# Patient Record
Sex: FEMALE | Race: BLACK OR AFRICAN AMERICAN | NOT HISPANIC OR LATINO | Employment: FULL TIME | URBAN - METROPOLITAN AREA
[De-identification: names, ages, dates, MRNs, and addresses within clinical notes are randomized per-mention and may not be internally consistent; named-entity substitution may affect disease eponyms.]

---

## 2017-02-13 ENCOUNTER — GENERIC CONVERSION - ENCOUNTER (OUTPATIENT)
Dept: OTHER | Facility: OTHER | Age: 52
End: 2017-02-13

## 2017-03-07 ENCOUNTER — ALLSCRIPTS OFFICE VISIT (OUTPATIENT)
Dept: OTHER | Facility: OTHER | Age: 52
End: 2017-03-07

## 2017-03-22 ENCOUNTER — ANESTHESIA EVENT (OUTPATIENT)
Dept: PERIOP | Facility: HOSPITAL | Age: 52
End: 2017-03-22

## 2017-03-23 ENCOUNTER — ANESTHESIA (OUTPATIENT)
Dept: PERIOP | Facility: HOSPITAL | Age: 52
End: 2017-03-23

## 2017-03-23 NOTE — ANESTHESIA PREPROCEDURE EVALUATION
Patient summary reviewed  Mallampati: III  TM distance: >3 FB  Neck ROM: full    PULMONARY - negative  Patient's breath sounds clear to auscultation. PULMONARY Positives: sleep apnea    NEURO/PSYCH - negative    ENDO/OTHER - negative    DENTAL     CARDIOVASCULAR - negative  CARDIOVASCULAR Positives: regular normal   Comments:  Additional Cardiovascular Comments: Systolic function was normal. Ejection fraction was estimated to be 60 % plus   or minus 5 %.   There were no regional wall motion abnormalities.   Left ventricular diastolic function parameters were normal.   RIGHT VENTRICLE:   The size was normal.   Systolic function was normal.   MITRAL VALVE:   There was mild annular calcification.   There was mild regurgitation.   TRICUSPID VALVE:   There was mild regurgitation.   Pulmonary artery systolic pressure was within the normal range.   Estimated peak PA pressure was 30 mmHg.   PULMONIC VALVE:   There was mild regurgitation.     GI/HEPATIC/RENAL - negative  GI/HEPATIC/RENAL Positives: GERD,     Anesthesia type: IV sedation with anesthesia  intravenous induction   Risks, benefits, and alternatives discussed with patient.  ASA 3

## 2017-04-06 ENCOUNTER — GENERIC CONVERSION - ENCOUNTER (OUTPATIENT)
Dept: OTHER | Facility: OTHER | Age: 52
End: 2017-04-06

## 2017-04-13 ENCOUNTER — HOSPITAL ENCOUNTER (OUTPATIENT)
Dept: MAMMOGRAPHY | Facility: CLINIC | Age: 52
Discharge: HOME | End: 2017-04-13
Payer: COMMERCIAL

## 2017-04-13 DIAGNOSIS — Z12.31 ENCOUNTER FOR SCREENING MAMMOGRAM FOR MALIGNANT NEOPLASM OF BREAST: ICD-10-CM

## 2017-04-13 PROCEDURE — G0202 SCR MAMMO BI INCL CAD: HCPCS

## 2017-04-24 ENCOUNTER — TRANSCRIBE ORDERS (OUTPATIENT)
Dept: LAB | Facility: CLINIC | Age: 52
End: 2017-04-24

## 2017-04-24 ENCOUNTER — APPOINTMENT (OUTPATIENT)
Dept: LAB | Facility: CLINIC | Age: 52
End: 2017-04-24
Payer: COMMERCIAL

## 2017-04-24 DIAGNOSIS — Z98.84 BARIATRIC SURGERY STATUS: ICD-10-CM

## 2017-04-24 DIAGNOSIS — M79.10 MYALGIA: ICD-10-CM

## 2017-04-24 DIAGNOSIS — I10 ESSENTIAL (PRIMARY) HYPERTENSION: ICD-10-CM

## 2017-04-24 DIAGNOSIS — K90.2 POSTOPERATIVE BLIND LOOP SYNDROME: Primary | ICD-10-CM

## 2017-04-24 LAB
25(OH)D3 SERPL-MCNC: 24.8 NG/ML (ref 30–100)
ALBUMIN SERPL BCP-MCNC: 3.8 G/DL (ref 3.5–5)
ALP SERPL-CCNC: 72 U/L (ref 46–116)
ALT SERPL W P-5'-P-CCNC: 17 U/L (ref 12–78)
ANION GAP SERPL CALCULATED.3IONS-SCNC: 5 MMOL/L (ref 4–13)
AST SERPL W P-5'-P-CCNC: 8 U/L (ref 5–45)
BILIRUB SERPL-MCNC: 0.75 MG/DL (ref 0.2–1)
BUN SERPL-MCNC: 18 MG/DL (ref 5–25)
CALCIUM SERPL-MCNC: 9 MG/DL (ref 8.3–10.1)
CHLORIDE SERPL-SCNC: 106 MMOL/L (ref 100–108)
CHOLEST SERPL-MCNC: 175 MG/DL (ref 50–200)
CO2 SERPL-SCNC: 28 MMOL/L (ref 21–32)
CREAT SERPL-MCNC: 0.61 MG/DL (ref 0.6–1.3)
ERYTHROCYTE [DISTWIDTH] IN BLOOD BY AUTOMATED COUNT: 12.9 % (ref 11.6–15.1)
FERRITIN SERPL-MCNC: 32 NG/ML (ref 8–388)
FOLATE SERPL-MCNC: 6.4 NG/ML (ref 3.1–17.5)
GFR SERPL CREATININE-BSD FRML MDRD: >60 ML/MIN/1.73SQ M
GLUCOSE P FAST SERPL-MCNC: 84 MG/DL (ref 65–99)
HCT VFR BLD AUTO: 39.6 % (ref 34.8–46.1)
HDLC SERPL-MCNC: 79 MG/DL (ref 40–60)
HGB BLD-MCNC: 12.9 G/DL (ref 11.5–15.4)
LDLC SERPL CALC-MCNC: 85 MG/DL (ref 0–100)
MCH RBC QN AUTO: 30 PG (ref 26.8–34.3)
MCHC RBC AUTO-ENTMCNC: 32.6 G/DL (ref 31.4–37.4)
MCV RBC AUTO: 92 FL (ref 82–98)
PLATELET # BLD AUTO: 242 THOUSANDS/UL (ref 149–390)
PMV BLD AUTO: 10.7 FL (ref 8.9–12.7)
POTASSIUM SERPL-SCNC: 3.7 MMOL/L (ref 3.5–5.3)
PROT SERPL-MCNC: 7.2 G/DL (ref 6.4–8.2)
PTH-INTACT SERPL-MCNC: 25.4 PG/ML (ref 14–72)
RBC # BLD AUTO: 4.3 MILLION/UL (ref 3.81–5.12)
SODIUM SERPL-SCNC: 139 MMOL/L (ref 136–145)
TRIGL SERPL-MCNC: 54 MG/DL
TSH SERPL DL<=0.05 MIU/L-ACNC: 1.73 UIU/ML (ref 0.36–3.74)
VIT B12 SERPL-MCNC: 648 PG/ML (ref 100–900)
WBC # BLD AUTO: 3.15 THOUSAND/UL (ref 4.31–10.16)

## 2017-04-24 PROCEDURE — 84590 ASSAY OF VITAMIN A: CPT

## 2017-04-24 PROCEDURE — 36415 COLL VENOUS BLD VENIPUNCTURE: CPT

## 2017-04-24 PROCEDURE — 80061 LIPID PANEL: CPT

## 2017-04-24 PROCEDURE — 82607 VITAMIN B-12: CPT

## 2017-04-24 PROCEDURE — 84425 ASSAY OF VITAMIN B-1: CPT

## 2017-04-24 PROCEDURE — 82728 ASSAY OF FERRITIN: CPT

## 2017-04-24 PROCEDURE — 85027 COMPLETE CBC AUTOMATED: CPT

## 2017-04-24 PROCEDURE — 83970 ASSAY OF PARATHORMONE: CPT

## 2017-04-24 PROCEDURE — 82746 ASSAY OF FOLIC ACID SERUM: CPT

## 2017-04-24 PROCEDURE — 84443 ASSAY THYROID STIM HORMONE: CPT

## 2017-04-24 PROCEDURE — 82306 VITAMIN D 25 HYDROXY: CPT

## 2017-04-24 PROCEDURE — 80053 COMPREHEN METABOLIC PANEL: CPT

## 2017-04-25 ENCOUNTER — GENERIC CONVERSION - ENCOUNTER (OUTPATIENT)
Dept: OTHER | Facility: OTHER | Age: 52
End: 2017-04-25

## 2017-04-27 LAB
VIT A SERPL-MCNC: 48 UG/DL (ref 20–65)
VIT B1 BLD-SCNC: 128.2 NMOL/L (ref 66.5–200)

## 2017-04-28 ENCOUNTER — ALLSCRIPTS OFFICE VISIT (OUTPATIENT)
Dept: OTHER | Facility: OTHER | Age: 52
End: 2017-04-28

## 2017-05-09 ENCOUNTER — GENERIC CONVERSION - ENCOUNTER (OUTPATIENT)
Dept: OTHER | Facility: OTHER | Age: 52
End: 2017-05-09

## 2017-05-18 ENCOUNTER — GENERIC CONVERSION - ENCOUNTER (OUTPATIENT)
Dept: OTHER | Facility: OTHER | Age: 52
End: 2017-05-18

## 2017-07-06 ENCOUNTER — ALLSCRIPTS OFFICE VISIT (OUTPATIENT)
Dept: OTHER | Facility: OTHER | Age: 52
End: 2017-07-06

## 2017-08-02 ENCOUNTER — TRANSCRIBE ORDERS (OUTPATIENT)
Dept: LAB | Facility: CLINIC | Age: 52
End: 2017-08-02

## 2017-08-02 ENCOUNTER — APPOINTMENT (OUTPATIENT)
Dept: LAB | Facility: CLINIC | Age: 52
End: 2017-08-02
Payer: COMMERCIAL

## 2017-08-02 DIAGNOSIS — Z00.8 HEALTH EXAMINATION IN POPULATION SURVEY: Primary | ICD-10-CM

## 2017-08-02 DIAGNOSIS — Z00.8 HEALTH EXAMINATION IN POPULATION SURVEY: ICD-10-CM

## 2017-08-02 LAB
CHOLEST SERPL-MCNC: 152 MG/DL (ref 50–200)
EST. AVERAGE GLUCOSE BLD GHB EST-MCNC: 108 MG/DL
HBA1C MFR BLD: 5.4 % (ref 4.2–6.3)
HDLC SERPL-MCNC: 75 MG/DL (ref 40–60)
LDLC SERPL CALC-MCNC: 70 MG/DL (ref 0–100)
TRIGL SERPL-MCNC: 33 MG/DL

## 2017-08-02 PROCEDURE — 80061 LIPID PANEL: CPT

## 2017-08-02 PROCEDURE — 36415 COLL VENOUS BLD VENIPUNCTURE: CPT

## 2017-08-02 PROCEDURE — 83036 HEMOGLOBIN GLYCOSYLATED A1C: CPT

## 2017-10-28 DIAGNOSIS — M25.571 PAIN IN RIGHT ANKLE: ICD-10-CM

## 2017-10-28 DIAGNOSIS — I10 ESSENTIAL (PRIMARY) HYPERTENSION: ICD-10-CM

## 2017-11-17 ENCOUNTER — ALLSCRIPTS OFFICE VISIT (OUTPATIENT)
Dept: OTHER | Facility: OTHER | Age: 52
End: 2017-11-17

## 2017-11-17 ENCOUNTER — APPOINTMENT (OUTPATIENT)
Dept: RADIOLOGY | Facility: CLINIC | Age: 52
End: 2017-11-17
Payer: COMMERCIAL

## 2017-11-17 DIAGNOSIS — M25.571 PAIN IN RIGHT ANKLE: ICD-10-CM

## 2017-11-17 PROCEDURE — 73610 X-RAY EXAM OF ANKLE: CPT

## 2017-11-20 NOTE — PROGRESS NOTES
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 37) (M19 101)    Plan  Health Maintenance, Osteoarthritis of right subtalar joint, Primary osteoarthritis of rightankle or foot    · Follow-up PRN Evaluation and Treatment  Follow-up  Status: Complete  Done:19Nov2017  Right ankle pain    · * XR ANKLE 3+ VIEW RIGHT; Status:Resulted - Requires Verification,Retrospective ByProtocol Authorization;   Done: [id number] 08:57AM    Discussion/Summary    We are referring the patient to a foot and ankle specialist for consideration of fusion  Chief Complaint    1  Ankle Pain    History of Present Illness  HPI: Patient is a 52-year-old Female who presents with chief complaint of swelling and pain in the right hindfoot and ankle  She has been worked up to this extensively in the past and had extensive treatment in the past including injections and orthotics and physical therapy and braces  Pain is worse with weight-bearing  She denies any warmth or erythema  Review of Systems   Constitutional: No fever, no chills, feels well, no tiredness, no recent weight gain or loss  Eyes: No complaints of eyesight problems, no red eyes  ENT: no loss of hearing, no nosebleeds, no sore throat  Cardiovascular: No complaints of chest pain, no palpitations, no leg claudication or lower extremity edema  Respiratory: no compliants of shortness of breath, no wheezing, no cough  Gastrointestinal: no complaints of abdominal pain, no constipation, no nausea or diarrhea, no vomiting, no bloody stools  Genitourinary: no complaints of dysuria, no incontinence  Musculoskeletal: as noted in HPI  Integumentary: no complaints of skin rash or lesion, no itching or dry skin, no skin wounds  Neurological: no complaints of headache, no confusion, no numbness or tingling, no dizziness    Endocrine: No complaints of muscle weakness, no feelings of weakness, no frequent urination, no excessive thirst   Psychiatric: No suicidal thoughts, no anxiety, no feelings of depression  ROS reviewed  Active Problems    1  Arthralgia of both knees (719 46) (M25 561,M25 562)   2  Benign essential hypertension (401 1) (I10)   3  Bilateral ankle pain (719 47) (M25 571,M25 572)   4  Depression (311) (F32 9)   5  Edema (782 3) (R60 9)   6  Encounter for screening mammogram for malignant neoplasm of breast (V76 12) (Z12 31)   7  Heartburn (787 1) (R12)   8  History of hypertension (V12 59) (Z86 79)   9  Leukopenia, unspecified decreased WBC count   10  Myalgia (729 1) (M79 1)   11  Obesity (278 00) (E66 9)   12  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 3) (K91 2,Z90 3)   13  Primary osteoarthritis of right ankle or foot (715 17) (M19 071)   14  Right ankle pain (719 47) (M25 571)   15  S/P laparoscopic sleeve gastrectomy (V45 86) (Z98 84)   16  Screening for colon cancer (V76 51) (Z12 11)   17  Screening for HPV (human papillomavirus) (V73 81) (Z11 51)   18  Vitamin D deficiency (268 9) (E55 9)    Past Medical History     · History of Acute pharyngitis (462) (J02 9)   · History of Denial Of Any Significant Medical History   · History of Encounter for routine gynecological examination with Papanicolaou smear ofcervix (V72 31,V76 2) (Z01 419)   · History of acute bronchitis (V12 69) (Z87 09)   · History of normal mammogram (V49 89)   · History of shortness of breath (V13 89) (T56 694)   · History of TORY (obstructive sleep apnea) (327 23) (G47 33)   · History of Preop cardiovascular exam (V72 81) (Z01 810)   · History of Vaginal Pap smear (V76 47) (Z12 72)   · History of Vitamin D deficiency (268 9) (E55 9)    The active problems and past medical history were reviewed and updated today        Surgical History     · History of Gastric Surgery For Morbid Obesity   · History of Gastric Surgery For Morbid Obesity Laparoscopic Longitudinal Gastrectomy   · History of Repair Of Paraesophageal Hiatus Hernia   · History of Tubal Ligation    The surgical history was reviewed and updated today  Family History  Mother    · Family history of    · Family history of diabetes mellitus (V18 0) (Z83 3)   · Family history of hypertension (V17 49) (Z82 49)  Father    · Family history of   Family History    · Family history of Denial Of Any Significant Medical History    The family history was reviewed and updated today  Social History     · Caffeine use (V49 89) (F15 90)   · Denied: History of Currently sexually active   · Denied: History of Drug use   · Exercise habits   · Never A Smoker   · No alcohol use   · Tubal ligation (V26 51) (Z98 51)  The social history was reviewed and updated today  Current Meds   1  Biotin 5000 MCG Oral Capsule; TAKE 1 CAPSULE DAILY Recorded   2  Fusion Plus Oral Capsule; ii po bid; Therapy: (Recorded:2015) to Recorded   3  Losartan Potassium-HCTZ 50-12 5 MG Oral Tablet; Take 1 tablet daily; Therapy: [id number] to (Last Rx:2017)  Requested for: 2017 Ordered   4  Omeprazole 20 MG Oral Capsule Delayed Release; Therapy: (Recorded:43Ecm1961) to Recorded   5  Vitamin D 2000 UNIT Oral Capsule; 1 CAP DAILY; Therapy: (Recorded:2015) to Recorded    The medication list was reviewed and updated today  Allergies  1  No Known Drug Allergies    Vitals  Signs     Heart Rate: 65  Systolic: 536  Diastolic: 94  Height: 5 ft 2 in  Weight: 185 lb   BMI Calculated: 33 84  BSA Calculated: 1 85    Physical Exam    Right Ankle: Appearance: a deformity (Calcaneovalgus and pes planus ), but-- no Achilles defect,-- no ecchymosis-- and-- no erythema  Tenderness: anterior lateral gutter-- and-- talar head  ROM: painful limited ROM in all planes Motor: Normal    Right Foot: Appearance: pes planus-- and-- swelling  Tenderness: medial calcaneous,-- lateral calcaneous-- and-- lateral hindfoot    Constitutional - General appearance: Normal   Musculoskeletal - Gait and station: Abnormal  Gait evaluation demonstrated antalgia on the right  -- Digits and nails: Normal -- Muscle strength/tone: Normal   Cardiovascular - Pulses: Normal -- Examination of extremities for edema and/or varicosities: Normal   Skin - Skin and subcutaneous tissue: Normal   Neurologic - Sensation: Normal   Psychiatric - Orientation to person, place, and time: Normal -- Mood and affect: Normal   Eyes  Conjunctiva and lids: Normal    Pupils and irises: Normal        Results/Data  * XR ANKLE 3+ VIEW RIGHT [id number] 08:57AM Erica Benson Order Number: [id number]  Performing Comments: room 1     Test Name Result Flag Reference   XR ANKLE 3+ VW RIGHT (Report)       RIGHT ANKLE   INDICATION: Pain in the right ankle and foot   COMPARISON: July 15, 2015   VIEWS: 3   IMAGES: 3   FINDINGS:   There is no acute fracture or dislocation  There is tibiotalar arthritis  Moderate-sized plantar calcaneal spur noted there is marked arthritic changes noted within the posterior subtalar joint  There is talonavicular arthritis  Ridging from the dorsal aspect of the talus  No lytic or blastic lesions are seen  Soft tissues are unremarkable  IMPRESSION:   Moderate degenerative changes seen within the posterior subtalar joint  Arthritic changes in the talonavicular joint  Tibiotalar arthritis with prominent anterior spurring from the distal tibia  This may represent an anterior impingement   Chronic deformity of the medial aspect of the distal fibula  Overall appearance is stable since previous study of foot July 15, 2015  A CT can be helpful to evaluate the the arthritic changes if needed    Workstation performed: [id number]   Signed by:  Johanny Gonzalez MD  11/19/17                                Views: AP, Lateral and oblique views of the right ankle  Findings: no fracture--   Calcaneovalgus  Arthritis of the subtalar joint and talonavicular joint  Calcifications soft tissue around ankle joint  There is mild soft tissue swelling  I personally reviewed the films/images/results in the office today  My interpretation follows  CT Scan Review CT right foot 06/01/2016is a nonosseous calcaneonavicular joint tarsal coalition and a nonosseous middle subtalar joint tarsal coalitionThere is severe osteoarthritis of both the posterior subtalar joint and middle subtalar joint  there is also arthritis of the talonavicular joint and to a lesser extent the ankle  Well corticated ossific fragment adjacent to the anterior aspect of the lateral malleolus        Future Appointments    Date/Time Provider Specialty Site   07/13/2018 10:00 AM Diana Newton, [facility]       Signatures   Electronically signed by : AGUSTIN Cano ; Nov 19 2017  9:20PM EST                       (Author)

## 2018-01-10 NOTE — MISCELLANEOUS
Message   Recorded as Task   Date: 05/09/2017 09:00 AM, Created By: Mauri Gerber   Task Name: Follow Up   Assigned To: Lindy Maldonado   Regarding Patient: Chris Rivera, Status: Active   Comment:    Viry Orr - 09 May 2017 9:00 AM     TASK CREATED  Please call patient to schedule f/u appt  with our office  Thank you  Per assigned task, I left message for Diallo Brown patient about scheduling an appointment at our office since patient is overdue for a follow up  Active Problems    1  Arthralgia of both knees (719 46) (M25 561,M25 562)   2  Benign essential hypertension (401 1) (I10)   3  Bilateral ankle pain (719 47) (M25 571,M25 572)   4  Depression (311) (F32 9)   5  Edema (782 3) (R60 9)   6  Encounter for screening mammogram for malignant neoplasm of breast (V76 12)   (Z12 31)   7  History of hypertension (V12 59) (Z86 79)   8  Leukopenia, unspecified decreased WBC count   9  Myalgia (729 1) (M79 1)   10  Obesity (278 00) (E66 9)   11  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 3) (K91 2,Z90 3)   12  Primary osteoarthritis of right ankle or foot (715 17) (M19 071)   13  S/P laparoscopic sleeve gastrectomy (V45 86) (Z98 84)   14  Screening for colon cancer (V76 51) (Z12 11)   15  Screening for HPV (human papillomavirus) (V73 81) (Z11 51)   16  Vitamin D deficiency (268 9) (E55 9)    Current Meds   1  Biotin 5000 MCG Oral Capsule; TAKE 1 CAPSULE DAILY Recorded   2  Fusion Plus Oral Capsule; ii po bid; Therapy: (Recorded:30Jan2015) to Recorded   3  Losartan Potassium-HCTZ 50-12 5 MG Oral Tablet; Take 1 tablet daily; Therapy: [id number] to (Last Rx:28Apr2017)  Requested for: 28Apr2017 Ordered   4  Vitamin D 2000 UNIT Oral Capsule; 1 CAP DAILY; Therapy: (Recorded:30Jan2015) to Recorded    Allergies    1   No Known Drug Allergies    Signatures   Electronically signed by : Hector Colindres, ; May  9 2017 10:38AM EST                       (Author)

## 2018-01-12 VITALS
WEIGHT: 185 LBS | DIASTOLIC BLOOD PRESSURE: 94 MMHG | HEART RATE: 65 BPM | HEIGHT: 62 IN | SYSTOLIC BLOOD PRESSURE: 163 MMHG | BODY MASS INDEX: 34.04 KG/M2

## 2018-01-14 VITALS
HEIGHT: 62 IN | RESPIRATION RATE: 18 BRPM | DIASTOLIC BLOOD PRESSURE: 82 MMHG | HEART RATE: 80 BPM | WEIGHT: 179 LBS | TEMPERATURE: 98 F | BODY MASS INDEX: 32.94 KG/M2 | SYSTOLIC BLOOD PRESSURE: 134 MMHG

## 2018-01-14 VITALS
OXYGEN SATURATION: 98 % | DIASTOLIC BLOOD PRESSURE: 94 MMHG | BODY MASS INDEX: 32.41 KG/M2 | SYSTOLIC BLOOD PRESSURE: 144 MMHG | HEART RATE: 78 BPM | WEIGHT: 176.13 LBS | HEIGHT: 62 IN

## 2018-01-15 VITALS
DIASTOLIC BLOOD PRESSURE: 78 MMHG | HEIGHT: 62 IN | BODY MASS INDEX: 32.39 KG/M2 | SYSTOLIC BLOOD PRESSURE: 122 MMHG | WEIGHT: 176 LBS

## 2018-01-16 NOTE — MISCELLANEOUS
Provider Comments  Provider Comments:   Dear Awilda Mallory had a scheduled appointment at our office for 02/13/2017 but were unable to keep  We attempted to call you back but were unable to reach you  It is very important that you follow up with us so that we can assess your physical and nutritional safety after your surgery  Please call our office at [phone] to reschedule your appointment         Sincerely,     [facility]          Signatures   Electronically signed by : Yannick William, ; Feb 13 2017 11:56AM EST                       (Author)

## 2018-01-17 NOTE — RESULT NOTES
Discussion/Summary  Your results show some abnormalities  2/8/16 labs reviewed  Your white blood cell count is low at 3 03  This can sometimes occur with rapid weight loss  White blood cells help the body fight off infection  If you having frequent illness or infection you should follow-up with her family doctor and have this further evaluated      Your vitamin B 12 is elevated at 1351  This is safe and okay  Your folate level is also elevated at 18 9 this is also safe and okay    Your HDL or "good cholesterol" is high at 77  This is good  Your other vitamin and mineral levels are within acceptable range  KEEP UP THE GOOD WORK taking all your supplements  Please keep your regularly scheduled follow-up appointment  If you do not have a follow-up scheduled, please call the office to schedule a follow-up visit  If you have any questions, please don't hesitate to call the office     Sincerely,      Signatures   Electronically signed by : HAKEEM Garcia; Mar  1 2016  2:07PM EST                       (Author)    Electronically signed by : Cala Rubinstein, M D ; Mar  2 2016  7:50AM EST                       (Co-author)

## 2018-01-18 NOTE — RESULT NOTES
Discussion/Summary   Your vitamin d is low at 24 8-   Both vitamin D and calcium are important for bone health  You need to add up the TOTAL  amount of vitamin D that is in your multivitamin/mineral (s) and what is in your calcium citrate with D together  The TOTAL  should add up to between 4766-2573 IU of vitamin D per day  You may need to add additional vitamin D3  to get to this level  Vitamin D 3  can be found over -the-counter  Your TOTAL  calcium amount for the day -which includes the calcium in  all your supplements ADDED TOGETHER should equal  1500 mg per day (up  to 1900 mg may be ok  )This includes the calcium that is in your Multivitamin/mineral(s) and what is in your calcium citrate with D   Calcium is best absorbed in divided doses of 500 mg each  Also it is best spaced 2 hours apart from any iron  Iron and calcium taken at the same time âfightâ to be absorbed, so do not take them at the same time  Your white blood cell count is low at 3 4  This can sometimes be seen during times of active rapid weight loss but should correct after that  White blood cells help the body fight off infection  If you are having frequent illness or infection, please review with PCP now otherwise review with PCP at a routine follow-up as this may need further evaluation             Verified Results  (1) CBC/ PLT (NO DIFF) 24Apr2017 11:43AM Gale Squires     Test Name Result Flag Reference   HEMATOCRIT 39 6 %  34 8-46 1   HEMOGLOBIN 12 9 g/dL  11 5-15 4   MCHC 32 6 g/dL  31 4-37 4   MCH 30 0 pg  26 8-34 3   MCV 92 fL  82-98   PLATELET COUNT 980 Thousands/uL  149-390   RBC COUNT 4 30 Million/uL  3 81-5 12   RDW 12 9 %  11 6-15 1   WBC COUNT 3 15 Thousand/uL L 4 31-10 16   MPV 10 7 fL  8 9-12 7     (1) PTH N-TERMINAL (INTACT) [id number] 11:43AM Ismael Newton     Test Name Result Flag Reference   PARATHYROID HORMONE INTACT 25 4 pg/mL  14 0-72 0     (1) VITAMIN D 25-HYDROXY [id number] 11:43AM Gil Diane     Test Name Result Flag Reference   VIT D 25-HYDROX 24 8 ng/mL L 30 0-100 0   This assay is a certified procedure of the CDC Vitamin D Standardization Certification Program (VDSCP)     Deficiency <20ng/ml   Insufficiency 20-30ng/ml   Sufficient  ng/ml     *Patients undergoing fluorescein dye angiography may retain small amounts of fluorescein in the body for 48-72 hours post procedure  Samples containing fluorescein can produce falsely elevated Vitamin D values  If the patient had this procedure, a specimen should be resubmitted post fluorescein clearance  (1) LIPID PANEL, FASTING 24Apr2017 11:43AM Juan Newton     Test Name Result Flag Reference   CHOLESTEROL 175 mg/dL     HDL,DIRECT 79 mg/dL H 40-60   Specimen collection should occur prior to Metamizole administration due to the potential for falsely depressed results  LDL CHOLESTEROL CALCULATED 85 mg/dL  0-100   This is a fasting blood test  Water,black tea or black  coffee only after 9:00pm the night before test  Drink 2 glasses of water the morning of test         Triglyceride:         Normal              <150 mg/dl       Borderline High    150-199 mg/dl       High               200-499 mg/dl       Very High          >499 mg/dl  Cholesterol:         Desirable        <200 mg/dl      Borderline High  200-239 mg/dl      High             >239 mg/dl  HDL Cholesterol:        High    >59 mg/dL      Low     <41 mg/dL  LDL CALCULATED:    This screening LDL is a calculated result  It does not have the accuracy of the Direct Measured LDL in the monitoring of patients with hyperlipidemia and/or statin therapy  Direct Measure LDL (GVM050) must be ordered separately in these patients  TRIGLYCERIDES 54 mg/dL  <=150   Specimen collection should occur prior to N-Acetylcysteine or Metamizole administration due to the potential for falsely depressed results       (1) VITAMIN B12 24Apr2017 11:43AM Gil Diane     Test Name Result Flag Reference   VITAMIN B12 648 pg/mL  100-900     (1) COMPREHENSIVE METABOLIC PANEL [id number] 81:42CT Johan Barksdale     Test Name Result Flag Reference   SODIUM 139 mmol/L  136-145   POTASSIUM 3 7 mmol/L  3 5-5 3   CHLORIDE 106 mmol/L  100-108   CARBON DIOXIDE 28 mmol/L  21-32   ANION GAP (CALC) 5 mmol/L  4-13   BLOOD UREA NITROGEN 18 mg/dL  5-25   CREATININE 0 61 mg/dL  0 60-1 30   Standardized to IDMS reference method   CALCIUM 9 0 mg/dL  8 3-10 1   BILI, TOTAL 0 75 mg/dL  0 20-1 00   ALK PHOSPHATAS 72 U/L     ALT (SGPT) 17 U/L  12-78   AST(SGOT) 8 U/L  5-45   ALBUMIN 3 8 g/dL  3 5-5 0   TOTAL PROTEIN 7 2 g/dL  6 4-8 2   eGFR Non-African American      >60 0 ml/min/1 73[facility] Disease Education Program recommendations are as follows:  GFR calculation is accurate only with a steady state creatinine  Chronic Kidney disease less than 60 ml/min/1 73 sq  meters  Kidney failure less than 15 ml/min/1 73 sq  meters     GLUCOSE FASTING 84 mg/dL  65-99     (1) FERRITIN [id number] 11:43AM Johan Barksdale     Test Name Result Flag Reference   FERRITIN 32 ng/mL  8-388     (1) FOLATE [id number] 11:43AM Johan Barksdale     Test Name Result Flag Reference   FOLATE 6 4 ng/mL  3 1-17 5     (1) VITAMIN A [id number] 11:43AM Johan Barksdale     Test Name Result Flag Reference   VITAMIN A 48 ug/dL  20 - 65   Performed at:  [address]  [id number]  : Baldemar Amin MD, Phone:  [phone]     (1) VITAMIN B1, WHOLE BLOOD [id number] 11:43AM Johan Barksdale     Test Name Result Flag Reference   VITAMIN B1, WHOLE BLOOD 128 2 nmol/L  66 5 - 200 0   Performed at:  [address]  [id number]  : Baldemar Amin MD, Phone:  [phone]

## 2018-01-31 ENCOUNTER — OFFICE VISIT (OUTPATIENT)
Dept: INTERNAL MEDICINE CLINIC | Facility: CLINIC | Age: 53
End: 2018-01-31
Payer: COMMERCIAL

## 2018-01-31 VITALS
HEIGHT: 64 IN | DIASTOLIC BLOOD PRESSURE: 80 MMHG | SYSTOLIC BLOOD PRESSURE: 132 MMHG | BODY MASS INDEX: 31.82 KG/M2 | HEART RATE: 68 BPM | WEIGHT: 186.4 LBS | RESPIRATION RATE: 14 BRPM

## 2018-01-31 DIAGNOSIS — M25.561 ARTHRALGIA OF BOTH KNEES: ICD-10-CM

## 2018-01-31 DIAGNOSIS — R12 HEARTBURN: ICD-10-CM

## 2018-01-31 DIAGNOSIS — M19.071 OSTEOARTHRITIS OF RIGHT ANKLE OR FOOT: ICD-10-CM

## 2018-01-31 DIAGNOSIS — Z00.00 HEALTH CARE MAINTENANCE: ICD-10-CM

## 2018-01-31 DIAGNOSIS — M25.562 ARTHRALGIA OF BOTH KNEES: ICD-10-CM

## 2018-01-31 DIAGNOSIS — I10 BENIGN ESSENTIAL HYPERTENSION: Primary | ICD-10-CM

## 2018-01-31 DIAGNOSIS — E66.09 CLASS 1 OBESITY DUE TO EXCESS CALORIES WITHOUT SERIOUS COMORBIDITY IN ADULT, UNSPECIFIED BMI: ICD-10-CM

## 2018-01-31 DIAGNOSIS — Z12.39 SCREENING FOR BREAST CANCER: ICD-10-CM

## 2018-01-31 DIAGNOSIS — E55.9 VITAMIN D DEFICIENCY: ICD-10-CM

## 2018-01-31 PROCEDURE — 99214 OFFICE O/P EST MOD 30 MIN: CPT | Performed by: INTERNAL MEDICINE

## 2018-01-31 RX ORDER — MULTIVIT-MIN/IRON/FOLIC ACID/K 18-600-40
CAPSULE ORAL DAILY
COMMUNITY

## 2018-01-31 RX ORDER — LOSARTAN POTASSIUM AND HYDROCHLOROTHIAZIDE 12.5; 5 MG/1; MG/1
1 TABLET ORAL DAILY
Qty: 90 TABLET | Refills: 3 | Status: SHIPPED | OUTPATIENT
Start: 2018-01-31 | End: 2018-08-07 | Stop reason: SDUPTHER

## 2018-01-31 RX ORDER — LOSARTAN POTASSIUM AND HYDROCHLOROTHIAZIDE 12.5; 5 MG/1; MG/1
1 TABLET ORAL DAILY
COMMUNITY
Start: 2016-12-19 | End: 2018-01-31 | Stop reason: SDUPTHER

## 2018-01-31 RX ORDER — NICOTINE POLACRILEX 4 MG/1
GUM, CHEWING ORAL
COMMUNITY
End: 2018-06-29 | Stop reason: ALTCHOICE

## 2018-01-31 NOTE — PROGRESS NOTES
INTERNAL MEDICINE OFFICE VISIT  [facility] AT [address], [address] [place] [address]  Tel: [phone]      NAME: Dallas Darby  AGE: 47 y o  SEX: female  : 1965   MRN: [id number]    DATE: 2018  TIME: 12:43 PM      Assessment and Plan:  1  Benign essential hypertension  Blood pressure is well controlled, continue same medication   - losartan-hydrochlorothiazide (HYZAAR) 50-12 5 mg per tablet; Take 1 tablet by mouth daily  Dispense: 90 tablet; Refill: 3  - CBC and differential; Future  - Comprehensive metabolic panel; Future  - Lipid panel; Future  - TSH, 3rd generation; Future  - Urinalysis with microscopic; Future    2  Heartburn  Takes omeprazole only as needed    3  Arthralgia of both knees  She was advised to follow up with Orthopedics  4  Osteoarthritis of right ankle or foot  She has been following up with Podiatry and says that she may need surgery in the future  5  Class 1 obesity due to excess calories without serious comorbidity in adult, unspecified BMI  Recently she has been getting weight and has 14 lb weight gain in the last 1 year  She says she can not exercise secondary to pain in her joints  I encouraged her to walk as much as possible and watch her diet  6  Vitamin D deficiency  Continue vitamin-D supplement    7  Screening for breast cancer    - Mammo screening bilateral w cad; Future    8  Health care maintenance    - Ambulatory referral to Gynecology; Future      - Counseling Documentation: patient was counseled regarding: diagnostic results, instructions for management, risk factor reductions, impressions, risks and benefits of treatment options and importance of compliance with treatment  - Medication Side Effects: Adverse side effects of medications were reviewed with the patient/guardian today  Return for follow up visit in 6 months or earlier, if needed        Chief Complaint:  Chief Complaint   Patient presents with   [facility] Follow-up    GERD         History of Present Illness:   Hypertension-she has been taking losartan and hydrochlorothiazide but feels that it can cause weight gain  I assured her that these medications will not cause weight gain and she needs to take them regularly  GERD-she has symptoms of heartburn off and on  Ankle pain-she follows up with Podiatry and had a shot of steroids in her right ankle today  Knee pain-she has pain in both her knees most likely due to osteoarthritis  Obesity-she had a gastric bypass surgery in the past and was doing well but because she stopped exercising she has been gaining weight for the last 1 year  Active Problem List:  Patient Active Problem List   Diagnosis    Arthralgia of both knees    Benign essential hypertension    Depression    Edema    Heartburn    Obesity    Postgastrectomy malabsorption    Osteoarthritis of ankle or foot    Vitamin D deficiency         Past Medical History:  Past Medical History:   Diagnosis Date    GERD (gastroesophageal reflux disease)     Obstructive sleep apnea     Last Assessed: 11Aug2015    Vitamin D deficiency     Last Assessed: 11Aug2015         Past Surgical History:  Past Surgical History:   Procedure Laterality Date    GASTRECTOMY      Gastric Surgery for Morbid Obesity Laparoscopic Longitudinal Gastrectomy: Onset: [id number]    GASTRIC RESTRICTION SURGERY      for Morbid Obesity; Last Assessed: [id number]    HIATAL HERNIA REPAIR      PARAESOPHAGEAL HERNIA REPAIR      Hiatus Hernia;  Onset: [id number]    STOMACH SURGERY      TUBAL LIGATION      TUBAL LIGATION           Family History:  Family History   Problem Relation Age of Onset    Diabetes Mother     Hypertension Mother     No Known Problems Family          Social History:  Social History     Social History    Marital status: /Civil Union     Spouse name: N/A    Number of children: N/A    Years of education: N/A     Social History Main Topics    Smoking status: Never Smoker    Smokeless tobacco: Never Used    Alcohol use No    Drug use: No    Sexual activity: Not Currently     Birth control/ protection: Surgical     Other Topics Concern    None     Social History Narrative    Caffeine use         Allergies:  No Known Allergies      Medications:    Current Outpatient Prescriptions:     Biotin 5000 MCG CAPS, Take 1 capsule by mouth daily, Disp: , Rfl:     Cholecalciferol (VITAMIN D) 2000 units CAPS, Take by mouth daily, Disp: , Rfl:     losartan-hydrochlorothiazide (HYZAAR) 50-12 5 mg per tablet, Take 1 tablet by mouth daily, Disp: 90 tablet, Rfl: 3    Omeprazole 20 MG TBEC, Take by mouth, Disp: , Rfl:       The following portions of the patient's history were reviewed and updated as appropriate: past medical history, past surgical history, family history, social history, allergies, current medications and active problem list       Review of Systems:  Constitutional: Denies fever, chills, weight gain, weight loss, fatigue  Eyes: Denies eye redness, eye discharge, double vision, change in visual acuity  ENT: Denies hearing loss, tinnitus, sneezing, nasal congestion, nasal discharge, sore throat   Respiratory: Denies cough, expectoration, hemoptysis, shortness of breath, wheezing  Cardiovascular: Denies chest pain, palpitations, lower extremity swelling, orthopnea, PND  Gastrointestinal: Denies abdominal pain, heartburn, nausea, vomiting, hematemesis, diarrhea, bloody stools  Genito-Urinary: Denies dysuria, frequency, difficulty in micturition, nocturia, incontinence  Musculoskeletal: Denies back pain, complains right ankle and knee joint pain, muscle pain  Neurologic: Denies confusion, lightheadedness, syncope, headache, focal weakness, sensory changes, seizures  Endocrine: Denies polyuria, polydipsia, temperature intolerance  Allergy and Immunology: Denies hives, insect bite sensitivity  Hematological and Lymphatic: Denies bleeding problems, swollen glands Psychological: Denies depression, suicidal ideation, anxiety, panic, mood swings  Dermatological: Denies pruritus, rash, skin lesion changes      Vitals:  Vitals:    01/31/18 1218   BP: 132/80   Pulse: 68   Resp: 14       Body mass index is 32 kg/m²  Weight (last 2 days)     Date/Time   Weight    01/31/18 1218  84 6 (186 4)                Physical Examination:  General: Patient is not in acute distress  Awake, alert, responding to commands  No weight gain or loss  Head: Normocephalic  Atraumatic  Eyes: Conjunctiva and lids with no swelling, erythema or discharge  Both pupils normal sized, round and reactive to light  Sclera nonicteric  ENT: External examination of nose and ear normal  Otoscopic examination shows translucent tympanic membranes with patent canals without erythema  Oropharynx moist with no erythema, edema, exudate or lesions  Neck: Supple  JVP not raised  Trachea midline  No masses  No thyromegaly  Lungs: No signs of increased work of breathing or respiratory distress  Bilateral bronchovascular breath sounds with no crackles or rhonchi  Chest wall: No tenderness  Cardiovascular: Normal PMI  No thrills  Regular rate and rhythm  S1 and S2 normal  No murmur, rub or gallop  Gastrointestinal: Abdomen soft, nontender  No guarding or rigidity  Liver and spleen not palpable  Bowel sounds present  Neurologic: Cranial nerves II-XII intact  Cortical functions normal  Motor system - Reflexes 2+ and symmetrical  Sensations normal  Musculoskeletal: Gait normal   Tenderness in both knees and right ankle    Integumentary: Skin normal with no rash or lesions  Lymphatic: No palpable lymph nodes in neck, axilla or groin  Extremities: No clubbing, cyanosis, edema or varicosities  Psychological: Judgement and insight normal  Mood and affect normal      Laboratory Results:  CBC with diff:   Lab Results   Component Value Date    WBC 3 15 (L) 04/24/2017    WBC 5 69 07/29/2015    RBC 4 30 04/24/2017    RBC 4 31 07/29/2015 HGB 12 9 04/24/2017    HGB 13 3 07/29/2015    HCT 39 6 04/24/2017    HCT 39 1 07/29/2015    MCV 92 04/24/2017    MCV 91 07/29/2015    MCH 30 0 04/24/2017    MCH 30 9 07/29/2015    RDW 12 9 04/24/2017    RDW 12 9 07/29/2015     04/24/2017     07/29/2015       CMP:  Lab Results   Component Value Date    CREATININE 0 61 04/24/2017    CREATININE 0 85 07/29/2015    BUN 18 04/24/2017    BUN 20 07/29/2015     04/24/2017     07/29/2015    K 3 7 04/24/2017    K 3 8 07/29/2015     04/24/2017     07/29/2015    CO2 28 04/24/2017    CO2 27 07/29/2015    GLUCOSE 77 02/08/2016    GLUCOSE 78 07/29/2015    PROT 7 2 04/24/2017    PROT 7 4 07/29/2015    ALKPHOS 72 04/24/2017    ALKPHOS 71 07/29/2015    ALT 17 04/24/2017    ALT 30 07/29/2015    AST 8 04/24/2017    AST 9 07/29/2015       Lab Results   Component Value Date    HGBA1C 5 4 08/02/2017    HGBA1C 4 9 08/31/2015       No results found for: TROPONINI, CKMB, CKTOTAL    Lipid Profile:   Lab Results   Component Value Date    CHOL 152 08/02/2017    CHOL 175 04/24/2017     Lab Results   Component Value Date    HDL 75 (H) 08/02/2017    HDL 79 (H) 04/24/2017     Lab Results   Component Value Date    LDLCALC 70 08/02/2017    LDLCALC 85 04/24/2017     Lab Results   Component Value Date    TRIG 33 08/02/2017    TRIG 54 04/24/2017             Health Maintenance: There are no preventive care reminders to display for this patient    Immunization History   Administered Date(s) Administered    Hep B, adult 08/31/2011    Influenza 10/01/2013    Influenza TIV (IM) 10/01/2013         Enriqueta Mario MD  1/31/2018,12:43 PM

## 2018-02-14 ENCOUNTER — OFFICE VISIT (OUTPATIENT)
Dept: URGENT CARE | Facility: MEDICAL CENTER | Age: 53
End: 2018-02-14
Payer: COMMERCIAL

## 2018-02-14 VITALS
TEMPERATURE: 98.5 F | SYSTOLIC BLOOD PRESSURE: 132 MMHG | HEART RATE: 75 BPM | OXYGEN SATURATION: 99 % | RESPIRATION RATE: 18 BRPM | DIASTOLIC BLOOD PRESSURE: 94 MMHG | BODY MASS INDEX: 30.9 KG/M2 | WEIGHT: 180 LBS

## 2018-02-14 DIAGNOSIS — M25.561 ACUTE PAIN OF RIGHT KNEE: Primary | ICD-10-CM

## 2018-02-14 PROCEDURE — 73562 X-RAY EXAM OF KNEE 3: CPT

## 2018-02-14 PROCEDURE — S9088 SERVICES PROVIDED IN URGENT: HCPCS | Performed by: FAMILY MEDICINE

## 2018-02-14 PROCEDURE — 99213 OFFICE O/P EST LOW 20 MIN: CPT | Performed by: FAMILY MEDICINE

## 2018-02-14 NOTE — PROGRESS NOTES
[facility]        NAME: Douglas Vo is a 47 y o  female  : 1965    MRN: [id number]  DATE: 2018  TIME: 1:11 PM    Assessment and Plan   Acute pain of right knee [M25 561]  1  Acute pain of right knee  X-ray knee right 3 views         Patient Instructions     Patient Instructions   Take Motrin as directed for pain  Right measures  Follow up with Orthopedics if no improvement 1-2 weeks  Follow up with PCP in 1-2 days  Go to the ER for worsening symptoms  Chief Complaint     Chief Complaint   Patient presents with    Knee Pain         History of Present Illness   Douglas Vo presents to the clinic c/o    Patient denies any injury trauma accident  Patient denies any previous knee injury  Knee Pain    The incident occurred 3 to 5 days ago  The incident occurred at home  There was no injury mechanism  The pain is present in the right knee  The quality of the pain is described as burning and aching  The pain is at a severity of 6/10  The pain is moderate  The pain has been constant since onset  Associated symptoms include an inability to bear weight and a loss of motion  Pertinent negatives include no loss of sensation, muscle weakness, numbness or tingling  The symptoms are aggravated by movement, weight bearing and palpation  She has tried nothing for the symptoms  Review of Systems   Review of Systems   Constitutional: Negative for chills, fatigue and fever  HENT: Negative for congestion, ear pain, hearing loss, postnasal drip, sinus pain, sinus pressure and sore throat  Eyes: Negative for pain and discharge  Respiratory: Negative for chest tightness and shortness of breath  Cardiovascular: Negative for chest pain  Gastrointestinal: Negative for abdominal pain, constipation, nausea and vomiting  Genitourinary: Negative for difficulty urinating  Musculoskeletal: Negative for arthralgias and myalgias  Skin: Negative for rash     Neurological: Negative for dizziness, tingling, numbness and headaches  Psychiatric/Behavioral: Negative for behavioral problems  Current Medications     Long-Term Prescriptions   Medication Sig Dispense Refill    Biotin 5000 MCG CAPS Take 1 capsule by mouth daily      Cholecalciferol (VITAMIN D) 2000 units CAPS Take by mouth daily      losartan-hydrochlorothiazide (HYZAAR) 50-12 5 mg per tablet Take 1 tablet by mouth daily 90 tablet 3    Omeprazole 20 MG TBEC Take by mouth      [DISCONTINUED] cyanocobalamin (VITAMIN B-12) 1,000 mcg tablet Take 1,000 mcg by mouth daily         Current Allergies     Allergies as of 02/14/2018    (No Known Allergies)            The following portions of the patient's history were reviewed and updated as appropriate: allergies, current medications, past family history, past medical history, past social history, past surgical history and problem list     Objective   /94 (BP Location: Right arm, Patient Position: Sitting, Cuff Size: Large)   Pulse 75   Temp 98 5 °F (36 9 °C) (Oral)   Resp 18   Wt 81 6 kg (180 lb)   SpO2 99%   BMI 30 90 kg/m²   X-RAY  Right Knee  I personally reviewed X-ray  No acute osseous abnormalities  Physical Exam     Physical Exam   Constitutional: She appears well-developed and well-nourished  Cardiovascular: Normal rate, regular rhythm and normal heart sounds  Pulmonary/Chest: Effort normal and breath sounds normal    Musculoskeletal:        Right knee: She exhibits decreased range of motion and swelling  She exhibits normal patellar mobility and no bony tenderness  Tenderness found  Lateral joint line tenderness noted  No MCL, no LCL and no patellar tendon tenderness noted  Negative Lachman's test  Negative anterior drawer   Pain with valgus stress    Neurological: She is alert  She has normal reflexes  Gait normal    Psychiatric: She has a normal mood and affect  Nursing note and vitals reviewed

## 2018-02-14 NOTE — PATIENT INSTRUCTIONS
Take Motrin as directed for pain  Right measures  Follow up with Orthopedics if no improvement 1-2 weeks  Follow up with PCP in 1-2 days  Go to the ER for worsening symptoms

## 2018-02-14 NOTE — LETTER
February 14, 2018     Patient: Nia Villafana   YOB: 1965   Date of Visit: 2/14/2018       To Whom it May Concern:    Nia Villafana was seen in my clinic on 2/14/2018  She may return to work on Please excuse illness  If you have any questions or concerns, please don't hesitate to call           Sincerely,          [facility]        CC: No Recipients

## 2018-02-26 ENCOUNTER — OFFICE VISIT (OUTPATIENT)
Dept: OBGYN CLINIC | Facility: CLINIC | Age: 53
End: 2018-02-26
Payer: COMMERCIAL

## 2018-02-26 VITALS
HEIGHT: 64 IN | SYSTOLIC BLOOD PRESSURE: 133 MMHG | DIASTOLIC BLOOD PRESSURE: 86 MMHG | WEIGHT: 182 LBS | HEART RATE: 58 BPM | BODY MASS INDEX: 31.07 KG/M2

## 2018-02-26 DIAGNOSIS — M17.11 PRIMARY OSTEOARTHRITIS OF RIGHT KNEE: Primary | ICD-10-CM

## 2018-02-26 DIAGNOSIS — M17.11 PATELLOFEMORAL ARTHRITIS OF RIGHT KNEE: ICD-10-CM

## 2018-02-26 DIAGNOSIS — M22.2X1 PATELLOFEMORAL PAIN SYNDROME OF RIGHT KNEE: ICD-10-CM

## 2018-02-26 PROCEDURE — 20610 DRAIN/INJ JOINT/BURSA W/O US: CPT | Performed by: FAMILY MEDICINE

## 2018-02-26 PROCEDURE — 99213 OFFICE O/P EST LOW 20 MIN: CPT | Performed by: FAMILY MEDICINE

## 2018-02-26 RX ORDER — MELOXICAM 15 MG/1
15 TABLET ORAL DAILY
Qty: 30 TABLET | Refills: 1 | Status: SHIPPED | OUTPATIENT
Start: 2018-02-26 | End: 2018-05-14 | Stop reason: SDUPTHER

## 2018-02-26 RX ADMIN — LIDOCAINE HYDROCHLORIDE 4 ML: 10 INJECTION, SOLUTION INFILTRATION; PERINEURAL at 09:10

## 2018-02-26 RX ADMIN — TRIAMCINOLONE ACETONIDE 40 MG: 40 INJECTION, SUSPENSION INTRA-ARTICULAR; INTRAMUSCULAR at 09:10

## 2018-02-26 NOTE — PROGRESS NOTES
Assessment/Plan:  Assessment/Plan   Diagnoses and all orders for this visit:    Primary osteoarthritis of right knee  -     Large joint arthrocentesis  -     meloxicam (MOBIC) 15 mg tablet; Take 1 tablet (15 mg total) by mouth daily  -     Ambulatory referral to Physical Therapy; Future    Patellofemoral arthritis of right knee  -     Large joint arthrocentesis  -     meloxicam (MOBIC) 15 mg tablet; Take 1 tablet (15 mg total) by mouth daily  -     Ambulatory referral to Physical Therapy; Future    Patellofemoral pain syndrome of right knee  -     Ambulatory referral to Physical Therapy; Future      47year old female with right knee pain  Discussed with patient physical exam, radiographs, impression, and plan  Her X-rays are remarkable for patellofemoral and tibiofemoral joint arthritis, most pronounced medially  Her physical exam is also noted for crepitus, mild medial and lateral joint space tenderness, and pain with patellar inhibition  I discussed with her treatment regimen of once daily meloxicam with food, corticosteroid injection, and physical therapy to which she agreed  I administered mixture of 4 cc 1% lidocaine and 1 cc Kenalog to the right knee without complication  She is to start taking meloxicam 15 mg once daily with food  I will refer her to formal physical therapy which she is to start as soon as possible and do home exercises as directed  She will follow up with me in 3 months at which point she will be re-evaluated  Subjective:   Patient ID: Will Palafox is a 47 y o  female  Chief Complaint   Patient presents with    Right Knee - Pain, Swelling         47year old female presents for evaluation of right knee pain of 4 weeks duration  She denies any trauma or inciting event  She reports associated swelling and stiffness  Stiffness is worse after sitting for prolonged period and then standing up   Pain is localized at the anterior lateral aspect, sometimes posterior, constant, and 10 out of 10 intensity  She has tried ice, heat, icy hot, ibuprofen without relief  She denies any locking/catching or instability  She presented to urgent care 12 days ago where X-rays were taken and were negative for acute osseous abnormality  Knee Pain   This is a new problem  The problem occurs constantly  The problem has been unchanged  Associated symptoms include arthralgias  The symptoms are aggravated by walking  She has tried NSAIDs, rest, ice and heat for the symptoms  The treatment provided no relief  The following portions of the patient's history were reviewed and updated as appropriate: She  has a past medical history of GERD (gastroesophageal reflux disease); Obstructive sleep apnea; and Vitamin D deficiency  She  has a past surgical history that includes Stomach surgery; Hiatal hernia repair; Tubal ligation; Gastric restriction surgery; Gastrectomy; Paraesophageal hernia repair; and Tubal ligation  Her family history includes Diabetes in her mother; Hypertension in her mother; No Known Problems in her family  She  reports that she has never smoked  She has never used smokeless tobacco  She reports that she does not drink alcohol or use drugs  She has No Known Allergies       Review of Systems   Musculoskeletal: Positive for arthralgias  Objective:  Vitals:    02/26/18 0829   BP: 133/86   Pulse: 58   Weight: 82 6 kg (182 lb)   Height: 5' 4" (1 626 m)     Right Ankle Exam   Right ankle exam is normal       Right Knee Exam     Tenderness   The patient is experiencing tenderness in the lateral joint line and medial joint line (Patella undersurface)      Range of Motion   Extension: -5   Flexion: 120     Tests   Bogdan:  Medial - negative Lateral - negative  Lachman:  Anterior - negative    Posterior - negative  Drawer:       Anterior - negative    Posterior - negative  Varus: negative  Valgus: negative    Other   Swelling: none    Comments:  Positive Patellar Inhibition      Right Hip Exam   Right hip exam is normal              Physical Exam   Constitutional: She is oriented to person, place, and time  She appears well-developed  No distress  HENT:   Head: Normocephalic  Eyes: Conjunctivae are normal    Neck: No tracheal deviation present  Cardiovascular: Normal rate  Pulmonary/Chest: Effort normal  No respiratory distress  Abdominal: She exhibits no distension  Neurological: She is alert and oriented to person, place, and time  Skin: Skin is warm and dry  Psychiatric: She has a normal mood and affect  Her behavior is normal        I have personally reviewed pertinent films in PACS and my interpretation is Tibiofemoral joint space narrowing, most pronounced medially  Patellofemoral arthritis      Large joint arthrocentesis  Date/Time: 2/26/2018 9:10 AM  Consent given by: patient  Site marked: site marked  Timeout: Immediately prior to procedure a time out was called to verify the correct patient, procedure, equipment, support staff and site/side marked as required   Supporting Documentation  Indications: pain   Procedure Details  Location: knee - R knee  Preparation: Patient was prepped and draped in the usual sterile fashion  Needle size: 22 G  Ultrasound guidance: no  Approach: lateral  Medications administered: 4 mL lidocaine 1 %; 40 mg triamcinolone acetonide 40 mg/mL    Patient tolerance: patient tolerated the procedure well with no immediate complications  Dressing:  Sterile dressing applied

## 2018-02-26 NOTE — PATIENT INSTRUCTIONS
Arthritis   AMBULATORY CARE:   Arthritis  is a disease that causes inflammation in one or more joints  There are many types of arthritis, such as osteoarthritis, rheumatoid arthritis, and septic arthritis  Some types cause inflammation in the joints  Other types wear away the cartilage between joints  This makes the bones of the joint rub together when you move the joint  Your symptoms may be constant, or symptoms may come and go  Arthritis often gets worse over time and can cause permanent joint damage  Common signs and symptoms of arthritis:   · Pain, swelling, or stiffness in the joint    · Limited range of motion in the joint    · Warmth or redness over the joint    · Tenderness when you touch the joint    · Stiff joints in the morning that loosen with movement    · A creaking or grinding sound when you move the joint    · Fever  Seek care immediately if:   · You have a fever and severe joint pain or swelling  · You cannot move the affected joint  · You have severe joint pain you cannot tolerate  Contact your healthcare provider if:   · Your pain or swelling does not get better with treatment  · You have questions or concerns about your condition or care  Treatment  will depend on the type of arthritis you have and if it is severe  You may need any of the following:  · Acetaminophen  decreases pain and fever  It is available without a doctor's order  Ask how much to take and how often to take it  Follow directions  Acetaminophen can cause liver damage if not taken correctly  · NSAIDs , such as ibuprofen, help decrease swelling, pain, and fever  This medicine is available with or without a doctor's order  NSAIDs can cause stomach bleeding or kidney problems in certain people  If you take blood thinner medicine, always ask your healthcare provider if NSAIDs are safe for you  Always read the medicine label and follow directions  · Steroid medicine  helps reduce swelling and pain       · Surgery may be needed to repair or replace a damaged joint  Manage arthritis:   · Rest your painful joint so it can heal   Your healthcare provider may recommend crutches or a walker if the affected joint is in a leg  · Apply ice or heat to the joint  Both can help decrease swelling and pain  Ice may also help prevent tissue damage  Use an ice pack, or put crushed ice in a plastic bag  Cover it with a towel and place it on your joint for 15 to 20 minutes every hour or as directed  You can apply heat for 20 minutes every 2 hours  Heat treatment includes hot packs or heat lamps  · Elevate your joint  Elevation helps reduce swelling and pain  Raise your joint above the level of your heart as often as you can  Prop your painful joint on pillows to keep it above your heart comfortably  · Go to physical or occupational therapy as directed  A physical therapist can teach you exercises to improve flexibility and range of motion  You may also be shown non-weight-bearing exercises that are safe for your joints, such as swimming  Exercise can help keep your joints flexible and reduce pain  An occupational therapist can help you learn to do your daily activities when your joints are stiff or sore  · Maintain a healthy weight  Extra weight puts increased pressure on your joints  Ask your healthcare provider what you should weigh  If you need to lose weight, he can help you create a weight loss program  Weight loss can help reduce pain and increase your ability to do your activities  The amount of exercise you do may vary each day, depending on your symptoms  · Wear flat or low-heeled shoes  This will help decrease pain and reduce pressure on your ankle, knee, and hip joints  · Use support devices as directed  You may be given splints to wear on your hands to help your joints rest and to decrease inflammation  While you sleep, use a pillow that is firm enough to support your neck and head    Other equipment  that may help you move and prevent falls:  · Orthotic shoes or insoles  help support your feet when you walk  · Crutches, a cane, or a walker  may help decrease your risk for falling  They also decrease stress on affected joints  · Devices to prevent falls  include raised toilet seats and bathtub bars to help you get up from sitting  Handrails can be placed in areas where you need balance and support  Follow up with your healthcare provider or rheumatologist as directed:  Write down your questions so you remember to ask them during your visits  © 2017 [facility] [place] Information is for End User's use only and may not be sold, redistributed or otherwise used for commercial purposes  All illustrations and images included in CareNotes® are the copyrighted property of [employer] , Bj  or Tobi Fry  The above information is an  only  It is not intended as medical advice for individual conditions or treatments  Talk to your doctor, nurse or pharmacist before following any medical regimen to see if it is safe and effective for you

## 2018-02-27 RX ORDER — LIDOCAINE HYDROCHLORIDE 10 MG/ML
4 INJECTION, SOLUTION INFILTRATION; PERINEURAL
Status: COMPLETED | OUTPATIENT
Start: 2018-02-26 | End: 2018-02-26

## 2018-02-27 RX ORDER — TRIAMCINOLONE ACETONIDE 40 MG/ML
40 INJECTION, SUSPENSION INTRA-ARTICULAR; INTRAMUSCULAR
Status: COMPLETED | OUTPATIENT
Start: 2018-02-26 | End: 2018-02-26

## 2018-04-12 ENCOUNTER — OFFICE VISIT (OUTPATIENT)
Dept: BARIATRICS | Facility: CLINIC | Age: 53
End: 2018-04-12
Payer: COMMERCIAL

## 2018-04-12 VITALS
HEIGHT: 62 IN | TEMPERATURE: 98.1 F | SYSTOLIC BLOOD PRESSURE: 132 MMHG | DIASTOLIC BLOOD PRESSURE: 84 MMHG | BODY MASS INDEX: 33.4 KG/M2 | WEIGHT: 181.5 LBS | HEART RATE: 72 BPM | RESPIRATION RATE: 18 BRPM

## 2018-04-12 DIAGNOSIS — Z98.84 BARIATRIC SURGERY STATUS: Primary | ICD-10-CM

## 2018-04-12 DIAGNOSIS — Z90.3 POSTGASTRECTOMY MALABSORPTION: ICD-10-CM

## 2018-04-12 DIAGNOSIS — K91.2 POSTGASTRECTOMY MALABSORPTION: ICD-10-CM

## 2018-04-12 DIAGNOSIS — R12 HEARTBURN: ICD-10-CM

## 2018-04-12 PROCEDURE — 99213 OFFICE O/P EST LOW 20 MIN: CPT | Performed by: SURGERY

## 2018-04-12 NOTE — PROGRESS NOTES
FOLLOW UP VISIT - BARIATRIC SURGERY  Greg Arriaza 47 y o  female MRN: [id number]  Unit/Bed#:  Encounter: [id number]      HPI:  Greg Arriaza is a 47 y o  female who presents with a history of laparoscopic sleeve gastrectomy now with heartburn and weight regain  Review of Systems    Historical Information   Past Medical History:   Diagnosis Date    GERD (gastroesophageal reflux disease)     Obstructive sleep apnea     Last Assessed: 11Aug2015    Vitamin D deficiency     Last Assessed: 11Aug2015     Past Surgical History:   Procedure Laterality Date    GASTRECTOMY      Gastric Surgery for Morbid Obesity Laparoscopic Longitudinal Gastrectomy: Onset: [id number]    GASTRIC RESTRICTION SURGERY      for Morbid Obesity; Last Assessed: [id number]    HIATAL HERNIA REPAIR      PARAESOPHAGEAL HERNIA REPAIR      Hiatus Hernia; Onset: [id number]    STOMACH SURGERY      TUBAL LIGATION      TUBAL LIGATION       Social History   History   Alcohol Use No     History   Drug Use No     History   Smoking Status    Never Smoker   Smokeless Tobacco    Never Used     Family History: non-contributory    Meds/Allergies   all medications and allergies reviewed  No Known Allergies    Objective       Current Vitals:   Blood Pressure: 132/84 (04/12/18 0908)  Pulse: 72 (04/12/18 0908)  Temperature: 98 1 °F (36 7 °C) (04/12/18 0908)  Respirations: 18 (04/12/18 0908)  Height: 5' 2" (157 5 cm) (04/12/18 0908)  Weight - Scale: 82 3 kg (181 lb 8 oz) (04/12/18 0908)        Invasive Devices          No matching active lines, drains, or airways          Physical Exam   Constitutional: She is oriented to person, place, and time  She appears well-developed  No distress  Abdominal: Soft  Bowel sounds are normal  She exhibits no distension and no mass  There is no tenderness  There is no rebound and no guarding  Well-healed laparoscopic incisions  No evidence of a hernia   Neurological: She is alert and oriented to person, place, and time  Psychiatric: She has a normal mood and affect  Her behavior is normal  Judgment and thought content normal    Vitals reviewed  Lab Results: I have personally reviewed pertinent lab results  Imaging: I have personally reviewed pertinent reports  EKG, Pathology, and Other Studies: I have personally reviewed pertinent reports  Assessment/PLAN:    47 y o  female with a history of laparoscopic sleeve gastrectomy in January of 2015  She started at 220 lb and at one point she lost 56% excess weight loss and she went down to 171  She has developed heartburn and as a result of these she has not been observing her diet as well resulting on her regaining some weight back  She tells me that she is not exercising as much as she did before  I am going to order an upper GI to further assess her anatomy and she will make an appointment with our dietitians to go over her dietary intake  She has not had nutritional labs done in a while so I am ordering that today as well  I had a detailed discussion with her stressing the fact that long-term success is largely dependent on compliance and abidance to the recommendations of the program as well as participation within the support groups  She is committed to continue to observe her diet and to increase her physical activity  She will follow up with us as scheduled      Chalmer Apley, MD  4/12/2018  9:18 AM

## 2018-04-18 ENCOUNTER — HOSPITAL ENCOUNTER (OUTPATIENT)
Dept: RADIOLOGY | Facility: HOSPITAL | Age: 53
Discharge: HOME | End: 2018-04-18
Attending: SURGERY
Payer: COMMERCIAL

## 2018-04-18 DIAGNOSIS — Z98.84 BARIATRIC SURGERY STATUS: ICD-10-CM

## 2018-04-18 DIAGNOSIS — Z90.3 POSTGASTRECTOMY MALABSORPTION: ICD-10-CM

## 2018-04-18 DIAGNOSIS — K91.2 POSTGASTRECTOMY MALABSORPTION: ICD-10-CM

## 2018-04-18 PROCEDURE — 74240 X-RAY XM UPR GI TRC 1CNTRST: CPT

## 2018-04-21 ENCOUNTER — APPOINTMENT (OUTPATIENT)
Dept: LAB | Facility: HOSPITAL | Age: 53
End: 2018-04-21
Attending: SURGERY
Payer: COMMERCIAL

## 2018-04-21 DIAGNOSIS — Z98.84 BARIATRIC SURGERY STATUS: ICD-10-CM

## 2018-04-21 DIAGNOSIS — K91.2 POSTGASTRECTOMY MALABSORPTION: ICD-10-CM

## 2018-04-21 DIAGNOSIS — Z90.3 POSTGASTRECTOMY MALABSORPTION: ICD-10-CM

## 2018-04-21 LAB
25(OH)D3 SERPL-MCNC: 22.3 NG/ML (ref 30–100)
ALBUMIN SERPL BCP-MCNC: 3.6 G/DL (ref 3.5–5)
ALP SERPL-CCNC: 56 U/L (ref 46–116)
ALT SERPL W P-5'-P-CCNC: 18 U/L (ref 12–78)
ANION GAP SERPL CALCULATED.3IONS-SCNC: 7 MMOL/L (ref 4–13)
AST SERPL W P-5'-P-CCNC: 10 U/L (ref 5–45)
BASOPHILS # BLD AUTO: 0.02 THOUSANDS/ΜL (ref 0–0.1)
BASOPHILS NFR BLD AUTO: 0 % (ref 0–1)
BILIRUB SERPL-MCNC: 0.7 MG/DL (ref 0.2–1)
BUN SERPL-MCNC: 18 MG/DL (ref 5–25)
CALCIUM SERPL-MCNC: 8.7 MG/DL (ref 8.3–10.1)
CHLORIDE SERPL-SCNC: 106 MMOL/L (ref 100–108)
CHOLEST SERPL-MCNC: 174 MG/DL (ref 50–200)
CO2 SERPL-SCNC: 29 MMOL/L (ref 21–32)
CREAT SERPL-MCNC: 0.79 MG/DL (ref 0.6–1.3)
EOSINOPHIL # BLD AUTO: 0.19 THOUSAND/ΜL (ref 0–0.61)
EOSINOPHIL NFR BLD AUTO: 4 % (ref 0–6)
ERYTHROCYTE [DISTWIDTH] IN BLOOD BY AUTOMATED COUNT: 12.7 % (ref 11.6–15.1)
GFR SERPL CREATININE-BSD FRML MDRD: 100 ML/MIN/1.73SQ M
GLUCOSE P FAST SERPL-MCNC: 85 MG/DL (ref 65–99)
HCT VFR BLD AUTO: 39.2 % (ref 34.8–46.1)
HDLC SERPL-MCNC: 80 MG/DL (ref 40–60)
HGB BLD-MCNC: 12.9 G/DL (ref 11.5–15.4)
LDLC SERPL CALC-MCNC: 91 MG/DL (ref 0–100)
LYMPHOCYTES # BLD AUTO: 1.54 THOUSANDS/ΜL (ref 0.6–4.47)
LYMPHOCYTES NFR BLD AUTO: 33 % (ref 14–44)
MCH RBC QN AUTO: 30.4 PG (ref 26.8–34.3)
MCHC RBC AUTO-ENTMCNC: 32.9 G/DL (ref 31.4–37.4)
MCV RBC AUTO: 92 FL (ref 82–98)
MONOCYTES # BLD AUTO: 0.32 THOUSAND/ΜL (ref 0.17–1.22)
MONOCYTES NFR BLD AUTO: 7 % (ref 4–12)
NEUTROPHILS # BLD AUTO: 2.57 THOUSANDS/ΜL (ref 1.85–7.62)
NEUTS SEG NFR BLD AUTO: 55 % (ref 43–75)
NONHDLC SERPL-MCNC: 94 MG/DL
NRBC BLD AUTO-RTO: 0 /100 WBCS
PLATELET # BLD AUTO: 219 THOUSANDS/UL (ref 149–390)
PMV BLD AUTO: 10.2 FL (ref 8.9–12.7)
POTASSIUM SERPL-SCNC: 3.7 MMOL/L (ref 3.5–5.3)
PROT SERPL-MCNC: 7.2 G/DL (ref 6.4–8.2)
PTH-INTACT SERPL-MCNC: 80.2 PG/ML (ref 18.4–80.1)
RBC # BLD AUTO: 4.25 MILLION/UL (ref 3.81–5.12)
SODIUM SERPL-SCNC: 142 MMOL/L (ref 136–145)
TRIGL SERPL-MCNC: 16 MG/DL
VIT B12 SERPL-MCNC: 550 PG/ML (ref 100–900)
WBC # BLD AUTO: 4.65 THOUSAND/UL (ref 4.31–10.16)

## 2018-04-21 PROCEDURE — 85025 COMPLETE CBC W/AUTO DIFF WBC: CPT

## 2018-04-21 PROCEDURE — 80053 COMPREHEN METABOLIC PANEL: CPT

## 2018-04-21 PROCEDURE — 36415 COLL VENOUS BLD VENIPUNCTURE: CPT

## 2018-04-21 PROCEDURE — 82607 VITAMIN B-12: CPT

## 2018-04-21 PROCEDURE — 82306 VITAMIN D 25 HYDROXY: CPT

## 2018-04-21 PROCEDURE — 80061 LIPID PANEL: CPT

## 2018-04-21 PROCEDURE — 84590 ASSAY OF VITAMIN A: CPT

## 2018-04-21 PROCEDURE — 83970 ASSAY OF PARATHORMONE: CPT

## 2018-04-21 PROCEDURE — 84425 ASSAY OF VITAMIN B-1: CPT

## 2018-04-25 LAB — VIT B1 BLD-SCNC: 84.5 NMOL/L (ref 66.5–200)

## 2018-04-26 LAB — VIT A SERPL-MCNC: 11 UG/DL (ref 33.1–100)

## 2018-04-27 ENCOUNTER — OFFICE VISIT (OUTPATIENT)
Dept: BARIATRICS | Facility: CLINIC | Age: 53
End: 2018-04-27
Payer: COMMERCIAL

## 2018-04-27 VITALS
RESPIRATION RATE: 18 BRPM | SYSTOLIC BLOOD PRESSURE: 132 MMHG | HEART RATE: 74 BPM | DIASTOLIC BLOOD PRESSURE: 80 MMHG | BODY MASS INDEX: 33.13 KG/M2 | TEMPERATURE: 98.3 F | HEIGHT: 62 IN | WEIGHT: 180 LBS

## 2018-04-27 DIAGNOSIS — R79.89 LOW SERUM VITAMIN A: ICD-10-CM

## 2018-04-27 DIAGNOSIS — K21.9 GASTROESOPHAGEAL REFLUX DISEASE, ESOPHAGITIS PRESENCE NOT SPECIFIED: ICD-10-CM

## 2018-04-27 DIAGNOSIS — E66.09 CLASS 1 OBESITY DUE TO EXCESS CALORIES WITHOUT SERIOUS COMORBIDITY IN ADULT, UNSPECIFIED BMI: Primary | ICD-10-CM

## 2018-04-27 DIAGNOSIS — Z98.84 BARIATRIC SURGERY STATUS: Primary | ICD-10-CM

## 2018-04-27 DIAGNOSIS — R10.13 EPIGASTRIC PAIN: Primary | ICD-10-CM

## 2018-04-27 DIAGNOSIS — Z98.84 BARIATRIC SURGERY STATUS: ICD-10-CM

## 2018-04-27 PROCEDURE — 99213 OFFICE O/P EST LOW 20 MIN: CPT | Performed by: SURGERY

## 2018-04-27 RX ORDER — SUCRALFATE 1 G/1
1 TABLET ORAL 4 TIMES DAILY
Qty: 120 TABLET | Refills: 2 | Status: SHIPPED | OUTPATIENT
Start: 2018-04-27 | End: 2018-06-29

## 2018-04-27 RX ORDER — ACETAMINOPHEN 325 MG/1
TABLET ORAL EVERY 6 HOURS
COMMUNITY
End: 2019-09-06

## 2018-04-27 NOTE — PROGRESS NOTES
FOLLOW UP VISIT - BARIATRIC SURGERY  Greg Arriaza 47 y o  female MRN: [id number]  Unit/Bed#:  Encounter: [id number]      HPI:  Greg Arriaza is a 47 y o  female who presents with a history of laparoscopic sleeve gastrectomy in January of 2015  Now with severe GERD  Review of Systems   All other systems reviewed and are negative  GI:   Positive heartburn    Historical Information   Past Medical History:   Diagnosis Date    GERD (gastroesophageal reflux disease)     Obstructive sleep apnea     Last Assessed: 11Aug2015    Vitamin D deficiency     Last Assessed: 11Aug2015     Past Surgical History:   Procedure Laterality Date    GASTRECTOMY      Gastric Surgery for Morbid Obesity Laparoscopic Longitudinal Gastrectomy: Onset: [id number]    GASTRIC RESTRICTION SURGERY      for Morbid Obesity; Last Assessed: [id number]    HIATAL HERNIA REPAIR      PARAESOPHAGEAL HERNIA REPAIR      Hiatus Hernia; Onset: [id number]    STOMACH SURGERY      TUBAL LIGATION      TUBAL LIGATION       Social History   History   Alcohol Use No     History   Drug Use No     History   Smoking Status    Never Smoker   Smokeless Tobacco    Never Used     Family History: non-contributory    Meds/Allergies   all medications and allergies reviewed  No Known Allergies    Objective       Current Vitals:   Blood Pressure: 132/80 (04/27/18 1008)  Pulse: 74 (04/27/18 1008)  Temperature: 98 3 °F (36 8 °C) (04/27/18 1008)  Respirations: 18 (04/27/18 1008)  Height: 5' 2" (157 5 cm) (04/27/18 1008)  Weight - Scale: 81 6 kg (180 lb) (04/27/18 1008)        Invasive Devices          No matching active lines, drains, or airways          Physical Exam   Constitutional: She is oriented to person, place, and time  She appears well-developed and well-nourished  No distress  Neurological: She is alert and oriented to person, place, and time  Psychiatric: She has a normal mood and affect   Her behavior is normal  Judgment and thought content normal  Vitals reviewed  Lab Results: I have personally reviewed pertinent lab results  Imaging: I have personally reviewed pertinent reports  I have reviewed the images with her in the office  Upper GI performed on April 12 revealed  Severe gastroesophageal reflux  An ulceration is noted near the GE junction in the lower esophagus probably sequela of reflux   No evidence of obstruction  Upper GI endoscopy can be helpful        Assessment/PLAN:    47 y o  female with a history of laparoscopic sleeve gastrectomy in January of 2015  She started at 220 lb and at one point she lost 56% excess weight loss and she went down to 171  She has developed severe heartburn and as a result of these she has not been observing her diet as well resulting on her regaining some weight back  I have reviewed the upper GI that showed severe gastroesophageal reflux with a question of a ulceration near the GE junction  I am scheduling her now to have an endoscopy with a potential Bravo probe placement to further assess this acid reflux  In the meantime she will increase her PPI to twice a day and I am adding Carafate to her regimen  She will stop the treatment days before the endoscopy for the potential Hernández placement  If we confirm the findings of the upper GI on the endoscopy and if she continues to be refractory to treatment she may need a revision to convert the sleeve to a gastric bypass  I had the opportunity to review her nutritional labs  Her vitamin-D is low her PTH is elevated  I am going to schedule her to have an appointment with our dietitians to go over her supplements accordingly  I had a detailed discussion with her stressing the fact that long-term success is largely dependent on compliance and abidance to the recommendations of the program as well as participation within the support groups  She is committed to continue to observe her diet and to increase her physical activity      She will follow up with us as scheduled      Maine Conte MD  4/27/2018  10:15 AM

## 2018-05-10 ENCOUNTER — TELEPHONE (OUTPATIENT)
Dept: OBGYN CLINIC | Facility: HOSPITAL | Age: 53
End: 2018-05-10

## 2018-05-14 DIAGNOSIS — M17.11 PRIMARY OSTEOARTHRITIS OF RIGHT KNEE: ICD-10-CM

## 2018-05-14 DIAGNOSIS — M17.11 PATELLOFEMORAL ARTHRITIS OF RIGHT KNEE: ICD-10-CM

## 2018-05-14 RX ORDER — MELOXICAM 15 MG/1
15 TABLET ORAL DAILY
Qty: 30 TABLET | Refills: 0 | Status: SHIPPED | OUTPATIENT
Start: 2018-05-14 | End: 2018-10-11 | Stop reason: SDUPTHER

## 2018-05-23 ENCOUNTER — ANESTHESIA EVENT (OUTPATIENT)
Dept: GASTROENTEROLOGY | Facility: HOSPITAL | Age: 53
End: 2018-05-23
Payer: COMMERCIAL

## 2018-05-23 NOTE — ANESTHESIA PREPROCEDURE EVALUATION
Review of Systems/Medical History  Patient summary reviewed  Chart reviewed  No history of anesthetic complications     Cardiovascular  Negative cardio ROS Hypertension ,   Comment: Systolic function was normal  Ejection fraction was estimated to be 60 % plus   or minus 5 %  There were no regional wall motion abnormalities  Left ventricular diastolic function parameters were normal    RIGHT VENTRICLE:   The size was normal    Systolic function was normal    MITRAL VALVE:   There was mild annular calcification  There was mild regurgitation  TRICUSPID VALVE:   There was mild regurgitation  Pulmonary artery systolic pressure was within the normal range  Estimated peak PA pressure was 30 mmHg  PULMONIC VALVE:   There was mild regurgitation  ,  Pulmonary  Negative pulmonary ROS Sleep apnea ,        GI/Hepatic    GERD well controlled,             Endo/Other  Negative endo/other ROS   Obesity    GYN       Hematology   Musculoskeletal    Arthritis     Neurology   Psychology   Depression ,              Physical Exam    Airway    Mallampati score: III  TM Distance: >3 FB  Neck ROM: full     Dental       Cardiovascular  Comment: Negative ROS, Rhythm: regular, Rate: normal,     Pulmonary  Breath sounds clear to auscultation,     Other Findings        Anesthesia Plan  ASA Score- 3     Anesthesia Type- IV sedation with anesthesia and general with ASA Monitors  Additional Monitors:   Airway Plan:         Plan Factors-    Induction- intravenous  Postoperative Plan-     Informed Consent- Anesthetic plan and risks discussed with patient

## 2018-05-24 ENCOUNTER — ANESTHESIA (OUTPATIENT)
Dept: GASTROENTEROLOGY | Facility: HOSPITAL | Age: 53
End: 2018-05-24
Payer: COMMERCIAL

## 2018-05-24 ENCOUNTER — HOSPITAL ENCOUNTER (OUTPATIENT)
Facility: HOSPITAL | Age: 53
Setting detail: OUTPATIENT SURGERY
Discharge: HOME | End: 2018-05-24
Attending: SURGERY | Admitting: SURGERY
Payer: COMMERCIAL

## 2018-05-24 VITALS
HEART RATE: 71 BPM | OXYGEN SATURATION: 98 % | WEIGHT: 180 LBS | HEIGHT: 64 IN | BODY MASS INDEX: 30.73 KG/M2 | SYSTOLIC BLOOD PRESSURE: 122 MMHG | DIASTOLIC BLOOD PRESSURE: 72 MMHG | TEMPERATURE: 96.9 F | RESPIRATION RATE: 16 BRPM

## 2018-05-24 DIAGNOSIS — Z98.84 BARIATRIC SURGERY STATUS: Primary | ICD-10-CM

## 2018-05-24 PROCEDURE — 88341 IMHCHEM/IMCYTCHM EA ADD ANTB: CPT | Performed by: PATHOLOGY

## 2018-05-24 PROCEDURE — 88305 TISSUE EXAM BY PATHOLOGIST: CPT | Performed by: PATHOLOGY

## 2018-05-24 PROCEDURE — 43239 EGD BIOPSY SINGLE/MULTIPLE: CPT | Performed by: SURGERY

## 2018-05-24 PROCEDURE — 88342 IMHCHEM/IMCYTCHM 1ST ANTB: CPT | Performed by: PATHOLOGY

## 2018-05-24 RX ORDER — SODIUM CHLORIDE 9 MG/ML
125 INJECTION, SOLUTION INTRAVENOUS CONTINUOUS
Status: DISCONTINUED | OUTPATIENT
Start: 2018-05-24 | End: 2018-05-24 | Stop reason: HOSPADM

## 2018-05-24 RX ORDER — PROPOFOL 10 MG/ML
INJECTION, EMULSION INTRAVENOUS AS NEEDED
Status: DISCONTINUED | OUTPATIENT
Start: 2018-05-24 | End: 2018-05-24 | Stop reason: SURG

## 2018-05-24 RX ADMIN — PROPOFOL 120 MG: 10 INJECTION, EMULSION INTRAVENOUS at 08:56

## 2018-05-24 RX ADMIN — SODIUM CHLORIDE 125 ML/HR: 0.9 INJECTION, SOLUTION INTRAVENOUS at 08:01

## 2018-05-24 RX ADMIN — LIDOCAINE HYDROCHLORIDE 100 MG: 20 INJECTION, SOLUTION INTRAVENOUS at 08:56

## 2018-05-24 RX ADMIN — PROPOFOL 30 MG: 10 INJECTION, EMULSION INTRAVENOUS at 08:58

## 2018-05-24 RX ADMIN — PROPOFOL 50 MG: 10 INJECTION, EMULSION INTRAVENOUS at 08:57

## 2018-05-24 NOTE — H&P (VIEW-ONLY)
FOLLOW UP VISIT - BARIATRIC SURGERY  Curly Rolle 47 y o  female MRN: [id number]  Unit/Bed#:  Encounter: [id number]      HPI:  Curly Rolle is a 47 y o  female who presents with a history of laparoscopic sleeve gastrectomy in January of 2015  Now with severe GERD  Review of Systems   All other systems reviewed and are negative  GI:   Positive heartburn    Historical Information   Past Medical History:   Diagnosis Date    GERD (gastroesophageal reflux disease)     Obstructive sleep apnea     Last Assessed: 11Aug2015    Vitamin D deficiency     Last Assessed: 11Aug2015     Past Surgical History:   Procedure Laterality Date    GASTRECTOMY      Gastric Surgery for Morbid Obesity Laparoscopic Longitudinal Gastrectomy: Onset: [id number]    GASTRIC RESTRICTION SURGERY      for Morbid Obesity; Last Assessed: [id number]    HIATAL HERNIA REPAIR      PARAESOPHAGEAL HERNIA REPAIR      Hiatus Hernia; Onset: [id number]    STOMACH SURGERY      TUBAL LIGATION      TUBAL LIGATION       Social History   History   Alcohol Use No     History   Drug Use No     History   Smoking Status    Never Smoker   Smokeless Tobacco    Never Used     Family History: non-contributory    Meds/Allergies   all medications and allergies reviewed  No Known Allergies    Objective       Current Vitals:   Blood Pressure: 132/80 (04/27/18 1008)  Pulse: 74 (04/27/18 1008)  Temperature: 98 3 °F (36 8 °C) (04/27/18 1008)  Respirations: 18 (04/27/18 1008)  Height: 5' 2" (157 5 cm) (04/27/18 1008)  Weight - Scale: 81 6 kg (180 lb) (04/27/18 1008)        Invasive Devices          No matching active lines, drains, or airways          Physical Exam   Constitutional: She is oriented to person, place, and time  She appears well-developed and well-nourished  No distress  Neurological: She is alert and oriented to person, place, and time  Psychiatric: She has a normal mood and affect   Her behavior is normal  Judgment and thought content normal  Vitals reviewed  Lab Results: I have personally reviewed pertinent lab results  Imaging: I have personally reviewed pertinent reports  I have reviewed the images with her in the office  Upper GI performed on April 12 revealed  Severe gastroesophageal reflux  An ulceration is noted near the GE junction in the lower esophagus probably sequela of reflux   No evidence of obstruction  Upper GI endoscopy can be helpful        Assessment/PLAN:    47 y o  female with a history of laparoscopic sleeve gastrectomy in January of 2015  She started at 220 lb and at one point she lost 56% excess weight loss and she went down to 171  She has developed severe heartburn and as a result of these she has not been observing her diet as well resulting on her regaining some weight back  I have reviewed the upper GI that showed severe gastroesophageal reflux with a question of a ulceration near the GE junction  I am scheduling her now to have an endoscopy with a potential Bravo probe placement to further assess this acid reflux  In the meantime she will increase her PPI to twice a day and I am adding Carafate to her regimen  She will stop the treatment days before the endoscopy for the potential Hernández placement  If we confirm the findings of the upper GI on the endoscopy and if she continues to be refractory to treatment she may need a revision to convert the sleeve to a gastric bypass  I had the opportunity to review her nutritional labs  Her vitamin-D is low her PTH is elevated  I am going to schedule her to have an appointment with our dietitians to go over her supplements accordingly  I had a detailed discussion with her stressing the fact that long-term success is largely dependent on compliance and abidance to the recommendations of the program as well as participation within the support groups  She is committed to continue to observe her diet and to increase her physical activity      She will follow up with us as scheduled      Christine Valle MD  4/27/2018  10:15 AM

## 2018-05-24 NOTE — DISCHARGE INSTRUCTIONS
Juan Antonio Bustos MD Physician Signed Bariatrics Op Note Date of Service: 2018  8:30 AM   Case ID: [id number] Case Time: 2018  8:55 AM Surgeon: Juan Antonio Bustos MD   Procedure: EGD with biopsy Location: [facility]          []Hide copied text  []Hover for attribution information  OPERATIVE REPORT  PATIENT NAME: Bairon Diaz    :  1965  MRN: [id number]  Pt Location: [facility]     SURGERY DATE: 2018     Surgeon(s) and Role:     * Juan Antonio Bustos MD - Primary     Preop Diagnosis:  Bariatric surgery status [Z98 84]     Post-Op Diagnosis Codes: * Bariatric surgery status [Z98 84]     Procedure(s) (LRB):  EGD POSSIBLE BRAVO PROBE (N/A)     Specimen(s):  * No specimens in log *     Estimated Blood Loss:   Minimal     Drains:        Anesthesia Type:   IV Sedation with Anesthesia     Operative Indications:  Bariatric surgery status [Z98 84]        Operative Findings:  Esophagitis LA grade B  Bravo test was not performed because of patient nickel allergy  Patient will need a 24 pH study instead     Complications:   None     Procedure and Technique:  Upper endoscopy and biopsy   I was present for the entire procedure     Patient Disposition:  hemodynamically stable                  Indication:   Patient suffers from morbid obesity and associated co-morbidities  and failed to achieve any meaningful or sustainable weight loss  Patient presented for a bariatric procedure and was consented for a preoperative upper endoscopy and biopsy to rule out H  Pylori      Description of the procedure: The patient was brought to the endoscopy suite and was placed in  left lateral decubitus position   A time-out was called and the patient was identified by name and by armband  The patient received IV  Propofol under closed monitoring  by the anesthesiologist and nurse anesthesist     Upper endoscopy was performed under direct visualization     Esophagus was visualized and showed severe esophagitis   The GE junction showed normal findings   The stomach was then inspected and showed no evidence of stricture at the incisura    First and second portion of duodenum were inspected and appeared to be normal  Biopsy was taken with a punch biopsy forceps from the antrum and sent to pathology to rule out H  Pylori  Retroflex view of the GE junction was obtained and was normal      The patient tolerated the procedure well and was transferred to the recovery unit in stable condition             I     SIGNATURE: Natan Wheatley MD  DATE: May 24, 2018  TIME: 8:55 AM                      Upper Endoscopy   WHAT YOU NEED TO KNOW:   An upper endoscopy is also called an upper gastrointestinal (GI) endoscopy, or an esophagogastroduodenoscopy (EGD)  You may feel bloated, gassy, or have some abdominal discomfort after your procedure  Your throat may be sore for 24 to 36 hours  You may burp or pass gas from air that is still inside your body  DISCHARGE INSTRUCTIONS:   Call 911 if:   · You have sudden chest pain or trouble breathing  Seek care immediately if:   · You feel dizzy or faint  · You have trouble swallowing  · You have severe throat pain  · Your bowel movements are very dark or black  · Your abdomen is hard and firm and you have severe pain  · You vomit blood  Contact your healthcare provider if:   · You feel full or bloated and cannot burp or pass gas  · You have not had a bowel movement for 3 days after your procedure  · You have neck pain  · You have a fever or chills  · You have nausea or are vomiting  · You have a rash or hives  · You have questions or concerns about your endoscopy  Relieve a sore throat:  Suck on throat lozenges or crushed ice  Gargle with a small amount of warm salt water  Mix 1 teaspoon of salt and 1 cup of warm water to make salt water  Relieve gas and discomfort from bloating:  Lie on your right side with a heating pad on your abdomen  Take short walks to help pass gas   Eat small meals until bloating is relieved  Rest after your procedure:  Do not drive or make important decisions until the day after your procedure  Return to your normal activity as directed  You can usually return to work the day after your procedure  Follow up with your healthcare provider as directed:  Write down your questions so you remember to ask them during your visits  © 2017 [employer] Information is for End User's use only and may not be sold, redistributed or otherwise used for commercial purposes  All illustrations and images included in CareNotes® are the copyrighted property of [employer]  or [employer]  The above information is an  only  It is not intended as medical advice for individual conditions or treatments  Talk to your doctor, nurse or pharmacist before following any medical regimen to see if it is safe and effective for you  Esophagitis   WHAT YOU NEED TO KNOW:   What is esophagitis? Esophagitis is inflammation or irritation of the lining of the esophagus  What causes esophagitis? The most common cause is acid reflux  This means stomach acid backs up into your esophagus  The following can also cause esophagitis:  · An infection from bacteria, a virus, or a fungus    · Vomiting or a hiatal hernia    · Medicines such as aspirin or NSAIDs    · Large pills taken without enough water or right before you go to bed    · Cancer treatment, such as radiation    · A toxic object you swallowed, such as a button battery, that gets stuck in your esophagus    · Too much caffeine or acidic or spicy foods    · Cigarette smoking  What are the signs and symptoms of esophagitis? Signs and symptoms depend on the cause of your esophagitis   You may have any of the following:  · Pain in the middle of your chest that may spread to your back    · Burning or pain in your esophagus, abdominal pain, or indigestion    · Trouble swallowing, or pain when you swallow    · A feeling that something is stuck in your esophagus    · Sore throat, a cough, or hoarseness    · Gagging, drooling, or wheezing    · Mouth sores (white patches), or a bad taste in your mouth or bad breath    · Nausea or vomiting    · Feeding problems or failure to thrive (young children)  How is esophagitis diagnosed? Your healthcare provider will ask about your symptoms and when they started  Tell him if anything makes your symptoms worse or better  You may need any of the following:  · An endoscopy  is a procedure used to look at your esophagus and stomach  Your healthcare provider will use an endoscope (tube with a camera and light on the end)  He may also take a tissue sample during the procedure  The sample may show if your esophagus was damaged by what is causing your esophagitis  · A barium swallow  is done to show if your esophagus was damaged and how badly it was damaged  X-rays are taken after you swallow barium liquid  Barium liquid is used to help damage show up on the x-ray  How is esophagitis treated? The goal of treatment is to help the lining of your esophagus heal and to prevent serious complications  Treatment will depend on what is causing your esophagitis  Symptoms caused by a toxic object such as a button battery need immediate treatment  Less severe causes may not need treatment  You may need any of the following if symptoms continue or get worse:  · Medicines  may be given to fight infection or to control stomach acid  Your healthcare provider may make changes to your medicines, such as changing it to a liquid form  · An elimination diet  may help you find foods that are causing your symptoms  You will stop eating certain foods that can cause esophagitis  Your healthcare provider will tell you to start eating them again one at a time   Each time you do not have symptoms, you will start eating another food from the list  Any food that does cause symptoms may be causing your esophagitis  · Surgery  may be needed if other treatments do not work  Part of your stomach can be wrapped to cover the valve between your stomach and esophagus  This helps prevent acid from backing up into your esophagus  What can I do to manage or prevent esophagitis? · Do not smoke  Nicotine and other chemicals in cigarettes and cigars can cause blood vessel and lung damage  Ask your healthcare provider for information if you currently smoke and need help to quit  E-cigarettes or smokeless tobacco still contain nicotine  Talk to your healthcare provider before you use these products  · Do not drink alcohol  Alcohol can irritate your esophagus  Talk to your healthcare provider if you need help to stop drinking  · Limit or do not eat foods that can lead to esophagitis  Foods such as oranges and salsa can irritate your esophagus  Caffeine and chocolate can cause acid reflux  High-fat and fried foods make your stomach digest food more slowly  This increases the amount of stomach acid your esophagus is exposed to  Eat small meals, and drink water with your meals  Soft foods such as yogurt and applesauce may help soothe your throat  Do not eat for at least 3 hours before you go to bed  · Keep batteries and similar objects out of the reach of children  Babies often put items in their mouths to explore them  Button batteries are easy to swallow and can cause serious damage  Keep the battery covers of electronic devices such as remote controls taped closed  Store all batteries and toxic materials where children cannot get to them  Use childproof locks to keep children away from dangerous materials  · Drink more liquid when you take pills  Drink a full glass of water when you take your pills  Ask your healthcare provider if you can take your pills at least an hour before you go to bed  · Prevent acid reflux  Do not bend over unless it is necessary   Acid may back up into your esophagus when you bend over  If possible, keep the head of your bed elevated while you sleep  This will help keep acid from backing up  Manage stress  Stress can make your symptoms worse or cause stomach acid to back up  Call 911 for any of the following:   · You have chest pain that does not go away within a few minutes or gets worse  When should I seek immediate care? · You feel like you have food stuck in your throat and you cannot cough it out  When should I contact my healthcare provider? · You have new or worsening symptoms, even after treatment  · You have questions or concerns about your condition or care  CARE AGREEMENT:   You have the right to help plan your care  Learn about your health condition and how it may be treated  Discuss treatment options with your caregivers to decide what care you want to receive  You always have the right to refuse treatment  The above information is an  only  It is not intended as medical advice for individual conditions or treatments  Talk to your doctor, nurse or pharmacist before following any medical regimen to see if it is safe and effective for you  © 2017 [employer] Information is for End User's use only and may not be sold, redistributed or otherwise used for commercial purposes  All illustrations and images included in CareNotes® are the copyrighted property of [employer]  or [employer]

## 2018-05-24 NOTE — OP NOTE
OPERATIVE REPORT  PATIENT NAME: Madison Urrutia    :  1965  MRN: [id number]  Pt Location: [facility]    SURGERY DATE: 2018    Surgeon(s) and Role:     * Yi Leyva MD - Primary    Preop Diagnosis:  Bariatric surgery status [Z98 84]    Post-Op Diagnosis Codes: * Bariatric surgery status [Z98 84]    Procedure(s) (LRB):  EGD POSSIBLE BRAVO PROBE (N/A)    Specimen(s):  * No specimens in log *    Estimated Blood Loss:   Minimal    Drains:       Anesthesia Type:   IV Sedation with Anesthesia    Operative Indications:  Bariatric surgery status [Z98 84]      Operative Findings:  Esophagitis LA grade B  Bravo test was not performed because of patient nickel allergy  Patient will need a 24 pH study instead    Complications:   None    Procedure and Technique:  Upper endoscopy and biopsy   I was present for the entire procedure    Patient Disposition:  hemodynamically stable             Indication:   Patient suffers from morbid obesity and associated co-morbidities  and failed to achieve any meaningful or sustainable weight loss  Patient presented for a bariatric procedure and was consented for a preoperative upper endoscopy and biopsy to rule out H  Pylori  Description of the procedure: The patient was brought to the endoscopy suite and was placed in  left lateral decubitus position  A time-out was called and the patient was identified by name and by armband  The patient received IV  Propofol under closed monitoring  by the anesthesiologist and nurse anesthesist     Upper endoscopy was performed under direct visualization  Esophagus was visualized and showed severe esophagitis   The GE junction showed normal findings   The stomach was then inspected and showed no evidence of stricture at the incisura    First and second portion of duodenum were inspected and appeared to be normal  Biopsy was taken with a punch biopsy forceps from the antrum and sent to pathology to rule out H  Pylori   Retroflex view of the GE junction was obtained and was normal      The patient tolerated the procedure well and was transferred to the recovery unit in stable condition           I    SIGNATURE: Brent Casiano MD  DATE: May 24, 2018  TIME: 8:55 AM

## 2018-06-11 ENCOUNTER — OFFICE VISIT (OUTPATIENT)
Dept: OBGYN CLINIC | Facility: CLINIC | Age: 53
End: 2018-06-11
Payer: COMMERCIAL

## 2018-06-11 VITALS
BODY MASS INDEX: 31.24 KG/M2 | DIASTOLIC BLOOD PRESSURE: 83 MMHG | HEIGHT: 64 IN | SYSTOLIC BLOOD PRESSURE: 138 MMHG | WEIGHT: 183 LBS | HEART RATE: 70 BPM

## 2018-06-11 DIAGNOSIS — M17.11 PRIMARY OSTEOARTHRITIS OF RIGHT KNEE: ICD-10-CM

## 2018-06-11 DIAGNOSIS — M22.2X1 PATELLOFEMORAL PAIN SYNDROME OF RIGHT KNEE: Primary | ICD-10-CM

## 2018-06-11 DIAGNOSIS — M17.11 PATELLOFEMORAL ARTHRITIS OF RIGHT KNEE: ICD-10-CM

## 2018-06-11 PROCEDURE — 99213 OFFICE O/P EST LOW 20 MIN: CPT | Performed by: FAMILY MEDICINE

## 2018-06-11 RX ORDER — MELOXICAM 15 MG/1
15 TABLET ORAL DAILY
Qty: 30 TABLET | Refills: 1 | Status: SHIPPED | OUTPATIENT
Start: 2018-06-11 | End: 2018-06-29 | Stop reason: ALTCHOICE

## 2018-06-11 NOTE — PROGRESS NOTES
Assessment/Plan:  Assessment/Plan   Diagnoses and all orders for this visit:    Patellofemoral pain syndrome of right knee  -     meloxicam (MOBIC) 15 mg tablet; Take 1 tablet (15 mg total) by mouth daily  -     Ambulatory referral to Physical Therapy; Future    Patellofemoral arthritis of right knee  -     meloxicam (MOBIC) 15 mg tablet; Take 1 tablet (15 mg total) by mouth daily  -     Ambulatory referral to Physical Therapy; Future    Primary osteoarthritis of right knee  -     meloxicam (MOBIC) 15 mg tablet; Take 1 tablet (15 mg total) by mouth daily  -     Ambulatory referral to Physical Therapy; Future          61-year-old female with osteoarthritis of the right knee  Discussed with patient physical exam, impression, and plan  Her physical exam is currently unremarkable for bony or soft tissue tenderness but there is appreciated crepitus on exam   She is to continue with meloxicam 15 mg once daily  Given that she has had schedule constraints for getting into formal physical therapy I will provide her referral to do consultation and provided instructions for home exercise program   She will follow up with me in 3 months at which point she will be re-evaluated  Subjective:   Patient ID: Lakesha Avalos is a 47 y o  female  Chief Complaint   Patient presents with    Right Knee - Follow-up        61-year-old female following up for right knee pain  She was last seen 3-1/2 months ago at which point she was assessed to have osteoarthritis of the tibiofemoral joint and patellofemoral joint spaces  She received corticosteroid injection to the right knee, was prescribed meloxicam, and referred to physical therapy  Today she reports that she has had improvement in pain since prior visit  She does have days where she has more pain than others particularly on days when she is ambulating more extensively and on days with rainy weather    Pain is described as generalized to the knee, achy, sometimes sharp, and improved with rest   She also reports that when she takes meloxicam this significantly helps with her pain, and she notices a difference when she does not take it  She does experience intermittent swelling  She has not had any buckling or instability of the knee  Knee Pain   This is a chronic problem  The current episode started more than 1 month ago  The problem occurs intermittently  The problem has been gradually improving  Associated symptoms include arthralgias and joint swelling  Pertinent negatives include no numbness or weakness  The symptoms are aggravated by standing and walking  She has tried rest and NSAIDs for the symptoms  The treatment provided moderate relief  Review of Systems   Musculoskeletal: Positive for arthralgias and joint swelling  Neurological: Negative for weakness and numbness  Objective:  Vitals:    06/11/18 1322   BP: 138/83   Pulse: 70   Weight: 83 kg (183 lb)   Height: 5' 4" (1 626 m)     Right Knee Exam     Tenderness   The patient is experiencing no tenderness  Range of Motion   Extension: normal   Flexion: 130     Muscle Strength     The patient has normal right knee strength  Tests   Bogdan:  Medial - negative Lateral - negative  Varus: negative  Valgus: negative    Other   Swelling: none  Other tests: no effusion present    Comments:  Mild patellar inhibition and grind          Observations     Right Knee   Negative for effusion  Physical Exam   Constitutional: She is oriented to person, place, and time  She appears well-developed  No distress  HENT:   Head: Normocephalic  Eyes: Conjunctivae are normal    Neck: No tracheal deviation present  Cardiovascular: Normal rate  Pulmonary/Chest: Effort normal  No respiratory distress  Abdominal: She exhibits no distension  Musculoskeletal:        Right knee: She exhibits no effusion  Neurological: She is alert and oriented to person, place, and time  Skin: Skin is warm and dry  Psychiatric: She has a normal mood and affect  Her behavior is normal    Nursing note and vitals reviewed

## 2018-06-25 ENCOUNTER — APPOINTMENT (OUTPATIENT)
Dept: PHYSICAL THERAPY | Facility: CLINIC | Age: 53
End: 2018-06-25
Payer: COMMERCIAL

## 2018-06-29 ENCOUNTER — OFFICE VISIT (OUTPATIENT)
Dept: BARIATRICS | Facility: CLINIC | Age: 53
End: 2018-06-29
Payer: COMMERCIAL

## 2018-06-29 VITALS
HEIGHT: 62 IN | DIASTOLIC BLOOD PRESSURE: 90 MMHG | SYSTOLIC BLOOD PRESSURE: 140 MMHG | HEART RATE: 67 BPM | WEIGHT: 181 LBS | TEMPERATURE: 99.1 F | BODY MASS INDEX: 33.31 KG/M2

## 2018-06-29 DIAGNOSIS — Z90.3 POSTGASTRECTOMY MALABSORPTION: ICD-10-CM

## 2018-06-29 DIAGNOSIS — K91.2 POSTGASTRECTOMY MALABSORPTION: ICD-10-CM

## 2018-06-29 DIAGNOSIS — I10 ESSENTIAL (PRIMARY) HYPERTENSION: ICD-10-CM

## 2018-06-29 DIAGNOSIS — Z98.84 BARIATRIC SURGERY STATUS: Primary | ICD-10-CM

## 2018-06-29 DIAGNOSIS — R10.13 EPIGASTRIC PAIN: Primary | ICD-10-CM

## 2018-06-29 DIAGNOSIS — K21.00 GASTROESOPHAGEAL REFLUX DISEASE WITH ESOPHAGITIS: ICD-10-CM

## 2018-06-29 PROBLEM — K21.9 GERD (GASTROESOPHAGEAL REFLUX DISEASE): Status: ACTIVE | Noted: 2018-06-29

## 2018-06-29 PROCEDURE — 99213 OFFICE O/P EST LOW 20 MIN: CPT | Performed by: SURGERY

## 2018-06-29 RX ORDER — SUCRALFATE ORAL 1 G/10ML
1 SUSPENSION ORAL 4 TIMES DAILY
Qty: 1200 ML | Refills: 0 | Status: SHIPPED | OUTPATIENT
Start: 2018-06-29 | End: 2018-08-07

## 2018-06-29 RX ORDER — SUCRALFATE ORAL 1 G/10ML
1 SUSPENSION ORAL 4 TIMES DAILY
Qty: 420 ML | Refills: 0 | Status: CANCELLED | OUTPATIENT
Start: 2018-06-29

## 2018-06-29 NOTE — PROGRESS NOTES
FOLLOW UP VISIT - BARIATRIC SURGERY  Amy Beltran 47 y o  female MRN: [id number]  Unit/Bed#:  Encounter: [id number]      HPI:  Amy Beltran is a 47 y o  female who presents with a history of laparoscopic sleeve gastrectomy and paraesophageal hernia repair in January of 2015  She has developed refractory heartburn  Here to discuss the results of her endoscopy  Review of Systems   All other systems reviewed and are negative  GI: Heartburn      Historical Information   Past Medical History:   Diagnosis Date    GERD (gastroesophageal reflux disease)     Obese     Obstructive sleep apnea     Last Assessed: 11Aug2015    Vitamin D deficiency     Last Assessed: 11Aug2015     Past Surgical History:   Procedure Laterality Date    ABDOMINAL SURGERY      ESOPHAGOGASTRODUODENOSCOPY      ESOPHAGOGASTRODUODENOSCOPY N/A 5/24/2018    Procedure: EGD with biopsy;  Surgeon: Ritchie Richards MD;  Location: [facility]; Service: [place]      Gastric Surgery for Morbid Obesity Laparoscopic Longitudinal Gastrectomy: Onset: [id number]    GASTRIC RESTRICTION SURGERY      for Morbid Obesity; Last Assessed: [id number]    HERNIA REPAIR      HIATAL HERNIA REPAIR      PARAESOPHAGEAL HERNIA REPAIR      Hiatus Hernia;  Onset: [id number]    STOMACH SURGERY      TUBAL LIGATION      TUBAL LIGATION       Social History   History   Alcohol Use No     History   Drug Use No     History   Smoking Status    Never Smoker   Smokeless Tobacco    Never Used     Family History: non-contributory    Meds/Allergies   all medications and allergies reviewed  Allergies   Allergen Reactions    Nickel        Objective       Current Vitals:   Blood Pressure: 140/90 (06/29/18 1115)  Pulse: 67 (06/29/18 1115)  Temperature: 99 1 °F (37 3 °C) (06/29/18 1115)  Height: 5' 2" (157 5 cm) (06/29/18 1115)  Weight - Scale: 82 1 kg (181 lb) (06/29/18 1115)        Invasive Devices          No matching active lines, drains, or airways Physical Exam   Constitutional: She is oriented to person, place, and time  She appears well-developed and well-nourished  No distress  Neurological: She is alert and oriented to person, place, and time  Psychiatric: She has a normal mood and affect  Her behavior is normal  Judgment and thought content normal    Vitals reviewed  Lab Results: I have personally reviewed pertinent lab results  Imaging: I have personally reviewed pertinent reports  EKG, Pathology, and Other Studies: I have personally reviewed pertinent reports  Her endoscopy revealed esophagitis LA grade B    Assessment/PLAN:    47 y o  female with a history of laparoscopic sleeve gastrectomy in January of 2015  She started at 220 lb and at one point she lost 56% excess weight loss and she went down to 171  She has developed severe heartburn and as a result of these she has not been observing her diet as well resulting on her regaining some weight back      I have reviewed the upper GI that showed severe gastroesophageal reflux with a question of a ulceration near the GE junction  I scheduled her now to have an endoscopy with a potential Bravo probe placement to further assess this acid reflux  Unfortunate she has a nickel allergy and the Bravo test was not performed  The EGD revealed esophagitis LA grade B  This point she is going to need 24 hour pH study     She is taking the PPI without significant improvement of her symptoms  Unfortunate she is not tolerating the Carafate as she has been crushing it herself  We will submit to her pharmacy with a liquid form in the hopes that it is covered by her plan         She will follow up with us as scheduled after the pH study is done    Raj Kraft MD  6/29/2018  11:30 AM

## 2018-07-11 ENCOUNTER — EVALUATION (OUTPATIENT)
Dept: PHYSICAL THERAPY | Facility: CLINIC | Age: 53
End: 2018-07-11
Payer: COMMERCIAL

## 2018-07-11 DIAGNOSIS — M17.11 PRIMARY OSTEOARTHRITIS OF RIGHT KNEE: ICD-10-CM

## 2018-07-11 DIAGNOSIS — M22.2X1 PATELLOFEMORAL PAIN SYNDROME OF RIGHT KNEE: Primary | ICD-10-CM

## 2018-07-11 DIAGNOSIS — M17.11 PATELLOFEMORAL ARTHRITIS OF RIGHT KNEE: ICD-10-CM

## 2018-07-11 PROCEDURE — G8978 MOBILITY CURRENT STATUS: HCPCS | Performed by: PHYSICAL THERAPIST

## 2018-07-11 PROCEDURE — 97161 PT EVAL LOW COMPLEX 20 MIN: CPT | Performed by: PHYSICAL THERAPIST

## 2018-07-11 PROCEDURE — G8979 MOBILITY GOAL STATUS: HCPCS | Performed by: PHYSICAL THERAPIST

## 2018-07-11 NOTE — PROGRESS NOTES
PT Evaluation     Today's date: 2018  Patient name: Robbie Terrazas  : 1965  MRN: [id number]  Referring provider: Gonzalez Ibrahim DO  Dx:   Encounter Diagnosis     ICD-10-CM    1  Patellofemoral pain syndrome of right knee M22 2X1 Ambulatory referral to Physical Therapy   2  Patellofemoral arthritis of right knee M17 11 Ambulatory referral to Physical Therapy   3  Primary osteoarthritis of right knee M17 11 Ambulatory referral to Physical Therapy       Start Time: 1020  Stop Time: 1053  Total time in clinic (min): 33 minutes    Assessment  Impairments: abnormal gait, abnormal or restricted ROM, activity intolerance, impaired balance, impaired physical strength, lacks appropriate home exercise program and pain with function  Functional limitations: gait, stairs  Assessment details: Pt is a 46 y/o female presenting to PT with chief complaints of R knee pain  Pt presents with WNL strength and A/PROM, although flexion is equal bilaterally  Pt presents with patella corrine and (+) patellar compression test to suggest PFPS  Pt's gait abnormalities are likely stemming from R heel/ankle pain, which pt is planning to have surgery to eliminate heel spurs in the near future  Pt's gait abnormalities are causing abnormal stresses on the knee  Pt would benefit from physical therapy in order to improve strength, stability, pain, biomechanics, and overall function  Understanding of Dx/Px/POC: good   Prognosis: good    Goals  ST  Pt will demonstrate independence with HEP  2  Pt will improve R knee flexion A/PROM by at least 10%  3  Pt will improve SLS to at least 5s without BUE support  4  Pt will improve BLE by at least 1/2 grade  5  Pt will have a 10% decrease in R ankle girth    LT  Pt will improve B knee flexion by at least 10%  2  Pt will improve BLE strength to at least 4+/5  3  Pt will be able to ascend/descend stairs with step over step gait pattern  4   Pt will be able to perform SLS on foam surface for at least 30s to show improved balance  5  Pt will have (-) patellar compression test  6  Pt will report pain no more than 2/10      Plan  Patient would benefit from: skilled physical therapy  Planned modality interventions: cryotherapy and thermotherapy: hydrocollator packs  Planned therapy interventions: therapeutic exercise, therapeutic activities, stretching, strengthening, patient education, neuromuscular re-education, massage, manual therapy, balance, gait training and home exercise program  Frequency: 2x week  Duration in weeks: 6  Treatment plan discussed with: patient        Subjective Evaluation    History of Present Illness  Mechanism of injury: Pt reports her knee pain has been feeling better lately  Pt reports the pain started about 1 year  Pt reports she had a cortisone shot about 3 months ago and has been taking Mobic, which has been helping  Pt reports keeping her knee pain increased after it has been stiff for a long time  Pt reports she has needed ankle surgery for heel spur and arthritis for about 3-4 years  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 and reports increased pain at the end  Pt reports going up and down the stairs, especially down, increases the knee pain  Pt reports sometimes her pain increases going from sitting to standing    Quality of life: good    Pain  Current pain ratin  At best pain ratin  At worst pain rating: 10  Quality: sharp and knife-like  Relieving factors: ice, medications and rest  Aggravating factors: walking and stair climbing      Diagnostic Tests  X-ray: abnormal (arthritis and effusion)  Patient Goals  Patient goals for therapy: decreased edema, decreased pain, independence with ADLs/IADLs and increased strength          Objective     Tenderness     Additional Tenderness Details  No TTP noted    Active Range of Motion   Left Knee   Flexion: 105 degrees   Extension: 0 degrees     Right Knee   Flexion: 105 degrees   Extension: 0 degrees   Left Ankle/Foot Dorsiflexion (ke): 5 degrees     Right Ankle/Foot   Dorsiflexion (ke): 7 degrees     Passive Range of Motion   Left Knee   Flexion: 110 degrees   Extension: 0 degrees     Right Knee   Flexion: 109 degrees   Extension: 0 degrees   Left Ankle/Foot    Dorsiflexion (ke): 10 degrees     Right Ankle/Foot    Dorsiflexion (ke): 10 degrees     Strength/Myotome Testing     Left Knee   Normal strength    Right Knee   Flexion: 4  Extension: 4+    Tests     Right Knee   Positive patellar compression  Negative lateral Bogdan, medial Bogdan, valgus stress test at 30 degrees and varus stress test at 30 degrees  Swelling     Left Knee Girth Measurement (cm)   Joint line: 44 cm    Right Knee Girth Measurement (cm)   Joint line: 44 cm  Left Ankle/Foot   Figure 8: 51 cm    Right Ankle/Foot   Figure 8: 54 cm    Ambulation     Observational Gait     Additional Observational Gait Details  Pt ambulates with decreased stance time on the R which causes decreased step length on the L  Functional Assessment     Comments  FSU 6": Increased lateral lean bilaterally  Eccentric step down 6":  Unable to control lowering with quad bilaterally    SLS: 1s RLE, 3s LLE  Tandem: 5s    General Comments     Knee Comments  Hamstring flexibility: no restriction  R hip flexor flexbility: moderate restriction      Flowsheet Rows      Most Recent Value   PT/OT G-Codes   Current Score  35   Projected Score  59   FOTO information reviewed  Yes   Assessment Type  Evaluation   G code set  Mobility: Walking & Moving Around   Mobility: Walking and Moving Around Current Status ()  CJ   Mobility: Walking and Moving Around Goal Status ()  CI          Precautions: N/A    Daily Treatment Diary     Manual  7/11            R knee PROM                                                                     Exercise Diary  7/11            Bike             Ecc LAQ             Leg Press             Hamstring curls             Psoas stretch             Prone quad stretch             Squats                                                                                                                                                                                          Modalities

## 2018-07-17 ENCOUNTER — OFFICE VISIT (OUTPATIENT)
Dept: PHYSICAL THERAPY | Facility: CLINIC | Age: 53
End: 2018-07-17
Payer: COMMERCIAL

## 2018-07-17 DIAGNOSIS — M22.2X1 PATELLOFEMORAL PAIN SYNDROME OF RIGHT KNEE: Primary | ICD-10-CM

## 2018-07-17 DIAGNOSIS — M17.11 PATELLOFEMORAL ARTHRITIS OF RIGHT KNEE: ICD-10-CM

## 2018-07-17 DIAGNOSIS — M17.11 PRIMARY OSTEOARTHRITIS OF RIGHT KNEE: ICD-10-CM

## 2018-07-17 PROCEDURE — 97112 NEUROMUSCULAR REEDUCATION: CPT

## 2018-07-17 PROCEDURE — 97140 MANUAL THERAPY 1/> REGIONS: CPT

## 2018-07-17 PROCEDURE — 97110 THERAPEUTIC EXERCISES: CPT

## 2018-07-17 NOTE — PROGRESS NOTES
Daily Note     Today's date: 2018  Patient name: Lakesha Avalos  : 1965  MRN: [id number]  Referring provider: Florencia Britton DO  Dx:   Encounter Diagnosis     ICD-10-CM    1  Patellofemoral pain syndrome of right knee M22 2X1    2  Patellofemoral arthritis of right knee M17 11    3  Primary osteoarthritis of right knee M17 11                   Subjective: Pt reports that she is doing well since IE  Notes that her knee is not painful today but her R ankle is in some pain  Objective: See treatment diary below  Precautions: N/A     Daily Treatment Diary      Manual                     R knee PROM    8'                                                                                                                         Exercise Diary                     Bike    8'                   Ecc LAQ    2x10                   Leg Press    80# 2x10                   Hamstring curls    2x10                   Psoas stretch    30"x3                   Prone quad stretch    30"x3                   Squats    2x10                                                                                                                                                                                                                                                                                                                                                 Modalities                                                                                                             Assessment: Tolerated treatment well demonstrating R knee flexion tightness that did improve slightly post manual PROM  Pt was able to complete all newly added TE today with good functional motion and no extra instructions needed  Patient demonstrated fatigue post treatment and exhibited good technique with therapeutic exercises  Pt supervised by ARUN LOMBARDI from [id number]      Plan: Continue per plan of care  Follow up with pt   Regarding symptoms and progress as appropriate

## 2018-07-20 ENCOUNTER — OFFICE VISIT (OUTPATIENT)
Dept: PHYSICAL THERAPY | Facility: CLINIC | Age: 53
End: 2018-07-20
Payer: COMMERCIAL

## 2018-07-20 DIAGNOSIS — M17.11 PATELLOFEMORAL ARTHRITIS OF RIGHT KNEE: ICD-10-CM

## 2018-07-20 DIAGNOSIS — M17.11 PRIMARY OSTEOARTHRITIS OF RIGHT KNEE: ICD-10-CM

## 2018-07-20 DIAGNOSIS — M22.2X1 PATELLOFEMORAL PAIN SYNDROME OF RIGHT KNEE: Primary | ICD-10-CM

## 2018-07-20 PROCEDURE — 97140 MANUAL THERAPY 1/> REGIONS: CPT | Performed by: PHYSICAL THERAPIST

## 2018-07-20 PROCEDURE — 97110 THERAPEUTIC EXERCISES: CPT | Performed by: PHYSICAL THERAPIST

## 2018-07-20 PROCEDURE — 97112 NEUROMUSCULAR REEDUCATION: CPT | Performed by: PHYSICAL THERAPIST

## 2018-07-20 NOTE — PROGRESS NOTES
Daily Note     Today's date: 2018  Patient name: Anne Cuellar  : 1965  MRN: [id number]  Referring provider: Sharon Quintana DO  Dx:   Encounter Diagnosis     ICD-10-CM    1  Patellofemoral pain syndrome of right knee M22 2X1    2  Patellofemoral arthritis of right knee M17 11    3  Primary osteoarthritis of right knee M17 11        Start Time: 1015  Stop Time: 1107  Total time in clinic (min): 52 minutes    Subjective: Patient notes she is doing okay today, no adverse effects from last session  Objective: See treatment diary below  Precautions: N/A     Daily Treatment Diary      Manual                   R knee PROM    8'  8'                                                                                                                       Exercise Diary                   Bike    8'  8'                 Ecc LAQ    2x10  3x10                 Leg Press    80# 2x10  80# 3x0                 Hamstring curls    2x10  3x10                 Psoas stretch    30"x3  30" x4                 Prone quad stretch    30"x3  30" x4                 Squats    2x10  3x10                                                                                                                                                                                                                                                                                                                                               Modalities                                                                                                       Assessment: Tolerated treatment well  Patient demonstrated fatigue post treatment and would benefit from continued PT  Progressed patient as able  Plan: Continue per plan of care

## 2018-08-02 ENCOUNTER — OFFICE VISIT (OUTPATIENT)
Dept: PHYSICAL THERAPY | Facility: CLINIC | Age: 53
End: 2018-08-02
Payer: COMMERCIAL

## 2018-08-02 DIAGNOSIS — M22.2X1 PATELLOFEMORAL PAIN SYNDROME OF RIGHT KNEE: Primary | ICD-10-CM

## 2018-08-02 DIAGNOSIS — M17.11 PRIMARY OSTEOARTHRITIS OF RIGHT KNEE: ICD-10-CM

## 2018-08-02 DIAGNOSIS — M17.11 PATELLOFEMORAL ARTHRITIS OF RIGHT KNEE: ICD-10-CM

## 2018-08-02 PROCEDURE — 97110 THERAPEUTIC EXERCISES: CPT | Performed by: PHYSICAL THERAPIST

## 2018-08-02 NOTE — PROGRESS NOTES
Daily Note     Today's date: 2018  Patient name: Wiliam Blank  : 1965  MRN: [id number]  Referring provider: Taj Cheng DO  Dx:   Encounter Diagnosis     ICD-10-CM    1  Patellofemoral pain syndrome of right knee M22 2X1    2  Patellofemoral arthritis of right knee M17 11    3  Primary osteoarthritis of right knee M17 11        Start Time: 1656  Stop Time: 1732  Total time in clinic (min): 36 minutes    Subjective: Pt reports her knee has been bothering her this week, which she attributes to the weather  Objective: See treatment diary below  Precautions: N/A     Daily Treatment Diary      Manual                 R knee PROM    8'  8' NP                                                                                                                     Exercise Diary                 Bike    8'  8' 5'               Ecc LAQ    2x10  3x10 2# 30x               Leg Press    80# 2x10  80# 3x0 100# 20x               Hamstring curls    2x10  3x10 2# 20x               Psoas stretch    30"x3  30" x4 30"x3               Prone quad stretch    30"x3  30" x4                 Squats    2x10  3x10 20x               Hip 3-way       20x RTB               SLR 3-way       2# 20x                                                                                                                                                                                                                                                                                             Modalities                                                                                                      Assessment: Pt able to complete all exercises without an increase in pain however did experience muscular fatigue following session  Min VC for proper posture and form during standing exercises  Plan: Progress as tolerated

## 2018-08-07 ENCOUNTER — OFFICE VISIT (OUTPATIENT)
Dept: INTERNAL MEDICINE CLINIC | Facility: CLINIC | Age: 53
End: 2018-08-07
Payer: COMMERCIAL

## 2018-08-07 ENCOUNTER — OFFICE VISIT (OUTPATIENT)
Dept: PHYSICAL THERAPY | Facility: CLINIC | Age: 53
End: 2018-08-07
Payer: COMMERCIAL

## 2018-08-07 VITALS
BODY MASS INDEX: 33.82 KG/M2 | DIASTOLIC BLOOD PRESSURE: 80 MMHG | WEIGHT: 183.8 LBS | HEART RATE: 80 BPM | HEIGHT: 62 IN | OXYGEN SATURATION: 98 % | SYSTOLIC BLOOD PRESSURE: 110 MMHG

## 2018-08-07 DIAGNOSIS — M17.11 PATELLOFEMORAL ARTHRITIS OF RIGHT KNEE: ICD-10-CM

## 2018-08-07 DIAGNOSIS — I10 BENIGN ESSENTIAL HYPERTENSION: Primary | ICD-10-CM

## 2018-08-07 DIAGNOSIS — F41.1 GENERALIZED ANXIETY DISORDER: ICD-10-CM

## 2018-08-07 DIAGNOSIS — E66.09 CLASS 1 OBESITY DUE TO EXCESS CALORIES WITHOUT SERIOUS COMORBIDITY IN ADULT, UNSPECIFIED BMI: ICD-10-CM

## 2018-08-07 DIAGNOSIS — M22.2X1 PATELLOFEMORAL PAIN SYNDROME OF RIGHT KNEE: Primary | ICD-10-CM

## 2018-08-07 DIAGNOSIS — E55.9 VITAMIN D DEFICIENCY: ICD-10-CM

## 2018-08-07 DIAGNOSIS — Z13.1 SCREENING FOR DIABETES MELLITUS: ICD-10-CM

## 2018-08-07 DIAGNOSIS — M17.11 PRIMARY OSTEOARTHRITIS OF RIGHT KNEE: ICD-10-CM

## 2018-08-07 DIAGNOSIS — K21.9 GASTROESOPHAGEAL REFLUX DISEASE WITHOUT ESOPHAGITIS: ICD-10-CM

## 2018-08-07 PROBLEM — Q66.89 TARSAL COALITION OF RIGHT FOOT: Status: ACTIVE | Noted: 2017-11-19

## 2018-08-07 PROCEDURE — 97112 NEUROMUSCULAR REEDUCATION: CPT

## 2018-08-07 PROCEDURE — 3079F DIAST BP 80-89 MM HG: CPT | Performed by: INTERNAL MEDICINE

## 2018-08-07 PROCEDURE — 99214 OFFICE O/P EST MOD 30 MIN: CPT | Performed by: INTERNAL MEDICINE

## 2018-08-07 PROCEDURE — 97110 THERAPEUTIC EXERCISES: CPT

## 2018-08-07 PROCEDURE — 97140 MANUAL THERAPY 1/> REGIONS: CPT

## 2018-08-07 PROCEDURE — 3074F SYST BP LT 130 MM HG: CPT | Performed by: INTERNAL MEDICINE

## 2018-08-07 RX ORDER — BUSPIRONE HYDROCHLORIDE 10 MG/1
10 TABLET ORAL 2 TIMES DAILY
Qty: 180 TABLET | Refills: 1 | Status: SHIPPED | OUTPATIENT
Start: 2018-08-07 | End: 2018-10-11 | Stop reason: SDUPTHER

## 2018-08-07 RX ORDER — LOSARTAN POTASSIUM AND HYDROCHLOROTHIAZIDE 12.5; 5 MG/1; MG/1
1 TABLET ORAL DAILY
Qty: 90 TABLET | Refills: 0 | Status: SHIPPED | OUTPATIENT
Start: 2018-08-07 | End: 2019-02-04

## 2018-08-07 NOTE — PROGRESS NOTES
Daily Note     Today's date: 2018  Patient name: Curly Rolle  : 1965  MRN: [id number]  Referring provider: Brayan Barrios DO  Dx:   Encounter Diagnosis     ICD-10-CM    1  Patellofemoral pain syndrome of right knee M22 2X1    2  Patellofemoral arthritis of right knee M17 11    3  Primary osteoarthritis of right knee M17 11                   Subjective: Upon presentation patient denies pain, chief complaint is soreness with prolonged walking  Objective: See treatment diary below  Precautions: N/A     Daily Treatment Diary      Manual               R knee PROM    8'  8' NP  8 min                                                                                                                    Exercise Diary               Bike    8'  8' 5'  10 min             Ecc LAQ    2x10  3x10 2# 30x  2# 30x             Leg Press    80# 2x10  80# 3x0 100# 20x  100# 40x             Hamstring curls    2x10  3x10 2# 20x  2# 20x             Psoas stretch    30"x3  30" x4 30"x3  30" x3             Prone quad stretch    30"x3  30" x4    30" x4             Squats    2x10  3x10 20x  20x             Hip 3-way       20x RTB  20x RTB             SLR 3-way       2# 20x  2# 20x                                                                                                                                                                                                                                                                                           Modalities                                                                                                      Assessment: Patient demonstrates good tolerance to TE, occasional verbal cuing utilized for the facilitation of proper form and dosage  Decreased muscular endurance demonstrated, fatigue noted with rest breaks required   Patient offers no complaints post session, patient would benefit from continued therapy to improve muscular endurance, muscular strength, and improve walking tolerance  Plan: Progress as tolerated

## 2018-08-07 NOTE — PROGRESS NOTES
INTERNAL MEDICINE OFFICE VISIT  [facility]  [facility], [place], [address] [place] [address]  Tel: [phone]      NAME: Javier Estevez  AGE: 47 y o  SEX: female  : 1965   MRN: [id number]    DATE: 2018  TIME: 11:31 AM      Assessment and Plan:  1  Benign essential hypertension  Continue medication  - losartan-hydrochlorothiazide (HYZAAR) 50-12 5 mg per tablet; Take 1 tablet by mouth daily  Dispense: 90 tablet; Refill: 0  - Lipid panel; Future  - TSH, 3rd generation; Future    2  Gastroesophageal reflux disease without esophagitis  She has not been taking any medication and is stable  3  Generalized anxiety disorder  She was started on BuSpar and was told to take it regularly  She will come back in 2 months for recheck  - busPIRone (BUSPAR) 10 mg tablet; Take 1 tablet (10 mg total) by mouth 2 (two) times a day  Dispense: 180 tablet; Refill: 1    4  Vitamin D deficiency  Continue vitamin-D supplement    5  Class 1 obesity due to excess calories without serious comorbidity in adult, unspecified BMI  She was told to increase her activity and cut down on the calories    6  Screening for diabetes mellitus    - Hemoglobin A1C; Future      - Counseling Documentation: patient was counseled regarding: diagnostic results, instructions for management, risk factor reductions, prognosis, patient and family education, impressions, risks and benefits of treatment options and importance of compliance with treatment  - Medication Side Effects: Adverse side effects of medications were reviewed with the patient/guardian today  Return for follow up visit in 2 months or earlier, if needed  Chief Complaint:  Chief Complaint   Patient presents with    Well Check         History of Present Illness:   Hypertension-blood pressure stable on present medication  GERD-she was told to take Carafate but she does not want to take it any more    She is feeling much better  Anxiety-recently she has a lot of anxiety while driving and is not able to go to even the familiar places because of the anxiety  Vitamin-D deficiency-takes vitamin-D supplement regularly  Obesity-she has been gaining weight and is not able to lose despite efforts  Active Problem List:  Patient Active Problem List   Diagnosis    Arthralgia of both knees    Benign essential hypertension    Edema    Heartburn    Obesity    Postgastrectomy malabsorption    Osteoarthritis    Vitamin D deficiency    Bariatric surgery status    Gastroesophageal reflux disease without esophagitis    Bilateral ankle pain    Tarsal coalition of right foot    Generalized anxiety disorder         Past Medical History:  Past Medical History:   Diagnosis Date    Depression 11/15/2013    GERD (gastroesophageal reflux disease)     Obese     Obstructive sleep apnea     Last Assessed: 11Aug2015    Vitamin D deficiency     Last Assessed: 11Aug2015         Past Surgical History:  Past Surgical History:   Procedure Laterality Date    ABDOMINAL SURGERY      ESOPHAGOGASTRODUODENOSCOPY      ESOPHAGOGASTRODUODENOSCOPY N/A 5/24/2018    Procedure: EGD with biopsy;  Surgeon: Erin Fernandez MD;  Location: [facility]; Service: [facility]      Gastric Surgery for Morbid Obesity Laparoscopic Longitudinal Gastrectomy: Onset: [id number]    GASTRIC RESTRICTION SURGERY      for Morbid Obesity; Last Assessed: [id number]    HERNIA REPAIR      HIATAL HERNIA REPAIR      PARAESOPHAGEAL HERNIA REPAIR      Hiatus Hernia;  Onset: [id number]    STOMACH SURGERY      TUBAL LIGATION      TUBAL LIGATION           Family History:  Family History   Problem Relation Age of Onset    Diabetes Mother     Hypertension Mother     No Known Problems Family          Social History:  Social History     Social History    Marital status: /Civil Union     Spouse name: N/A    Number of children: N/A    Years of education: N/A Social History Main Topics    Smoking status: Never Smoker    Smokeless tobacco: Never Used    Alcohol use No    Drug use: No    Sexual activity: Not Currently     Birth control/ protection: Surgical     Other Topics Concern    None     Social History Narrative    Caffeine use         Allergies:   Allergies   Allergen Reactions    Nickel          Medications:    Current Outpatient Prescriptions:     acetaminophen (TYLENOL) 325 mg tablet, every 6 (six) hours, Disp: , Rfl:     Biotin 5000 MCG CAPS, Take 1 capsule by mouth daily, Disp: , Rfl:     Cholecalciferol (VITAMIN D) 2000 units CAPS, Take by mouth daily, Disp: , Rfl:     losartan-hydrochlorothiazide (HYZAAR) 50-12 5 mg per tablet, Take 1 tablet by mouth daily, Disp: 90 tablet, Rfl: 0    meloxicam (MOBIC) 15 mg tablet, Take 1 tablet (15 mg total) by mouth daily, Disp: 30 tablet, Rfl: 0    Multiple Vitamins-Minerals (MULTIVITAMIN ADULT EXTRA C PO), multivitamin, Disp: , Rfl:     Omega-3 Fatty Acids (FISH OIL PO), Take by mouth, Disp: , Rfl:     busPIRone (BUSPAR) 10 mg tablet, Take 1 tablet (10 mg total) by mouth 2 (two) times a day, Disp: 180 tablet, Rfl: 1      The following portions of the patient's history were reviewed and updated as appropriate: past medical history, past surgical history, family history, social history, allergies, current medications and active problem list       Review of Systems:  Constitutional: Denies fever, chills, weight gain, weight loss, fatigue  Eyes: Denies eye redness, eye discharge, double vision, change in visual acuity  ENT: Denies hearing loss, tinnitus, sneezing, nasal congestion, nasal discharge, sore throat   Respiratory: Denies cough, expectoration, hemoptysis, shortness of breath, wheezing  Cardiovascular: Denies chest pain, palpitations, lower extremity swelling, orthopnea, PND  Gastrointestinal: Denies abdominal pain, heartburn, nausea, vomiting, hematemesis, diarrhea, bloody stools  Genito-Urinary: Denies dysuria, frequency, difficulty in micturition, nocturia, incontinence  Musculoskeletal: Denies back pain, joint pain, muscle pain  Neurologic: Denies confusion, lightheadedness, syncope, headache, focal weakness, sensory changes, seizures  Endocrine: Denies polyuria, polydipsia, temperature intolerance  Allergy and Immunology: Denies hives, insect bite sensitivity  Hematological and Lymphatic: Denies bleeding problems, swollen glands   Psychological: Denies depression, suicidal ideation, has anxiety during drive  Dermatological: Denies pruritus, rash, skin lesion changes      Vitals:  Vitals:    08/07/18 1119   BP: 110/80   Pulse: 80   SpO2: 98%       Body mass index is 33 62 kg/m²  Weight (last 2 days)     Date/Time   Weight    08/07/18 1119  83 4 (183 8)                Physical Examination:  General: Patient is not in acute distress  Awake, alert, responding to commands  No weight gain or loss  Head: Normocephalic  Atraumatic  Eyes: Conjunctiva and lids with no swelling, erythema or discharge  Both pupils normal sized, round and reactive to light  Sclera nonicteric  ENT: External examination of nose and ear normal  Otoscopic examination shows translucent tympanic membranes with patent canals without erythema  Oropharynx moist with no erythema, edema, exudate or lesions  Neck: Supple  JVP not raised  Trachea midline  No masses  No thyromegaly  Lungs: No signs of increased work of breathing or respiratory distress  Bilateral bronchovascular breath sounds with no crackles or rhonchi  Chest wall: No tenderness  Cardiovascular: Normal PMI  No thrills  Regular rate and rhythm  S1 and S2 normal  No murmur, rub or gallop  Gastrointestinal: Abdomen soft, nontender  No guarding or rigidity  Liver and spleen not palpable  Bowel sounds present  Neurologic: Cranial nerves II-XII intact   Cortical functions normal  Motor system - Reflexes 2+ and symmetrical  Sensations normal  Musculoskeletal: Gait normal  No joint tenderness  Integumentary: Skin normal with no rash or lesions  Lymphatic: No palpable lymph nodes in neck, axilla or groin  Extremities: No clubbing, cyanosis, edema or varicosities  Psychological: Judgement and insight normal  Anxious      Laboratory Results:  CBC with diff:   Lab Results   Component Value Date    WBC 4 65 04/21/2018    WBC 5 69 07/29/2015    RBC 4 25 04/21/2018    RBC 4 31 07/29/2015    HGB 12 9 04/21/2018    HGB 13 3 07/29/2015    HCT 39 2 04/21/2018    HCT 39 1 07/29/2015    MCV 92 04/21/2018    MCV 91 07/29/2015    MCH 30 4 04/21/2018    MCH 30 9 07/29/2015    RDW 12 7 04/21/2018    RDW 12 9 07/29/2015     04/21/2018     07/29/2015       CMP:  Lab Results   Component Value Date    CREATININE 0 79 04/21/2018    CREATININE 0 85 07/29/2015    BUN 18 04/21/2018    BUN 20 07/29/2015     04/21/2018     07/29/2015    K 3 7 04/21/2018    K 3 8 07/29/2015     04/21/2018     07/29/2015    CO2 29 04/21/2018    CO2 27 07/29/2015    GLUCOSE 77 02/08/2016    GLUCOSE 78 07/29/2015    PROT 7 2 04/21/2018    PROT 7 4 07/29/2015    ALKPHOS 56 04/21/2018    ALKPHOS 71 07/29/2015    ALT 18 04/21/2018    ALT 30 07/29/2015    AST 10 04/21/2018    AST 9 07/29/2015       Lab Results   Component Value Date    HGBA1C 5 4 08/02/2017    HGBA1C 4 9 08/31/2015       No results found for: TROPONINI, CKMB, CKTOTAL    Lipid Profile:   Lab Results   Component Value Date    CHOL 174 04/21/2018    CHOL 152 08/02/2017     Lab Results   Component Value Date    HDL 80 (H) 04/21/2018    HDL 75 (H) 08/02/2017     Lab Results   Component Value Date    LDLCALC 91 04/21/2018    LDLCALC 70 08/02/2017     Lab Results   Component Value Date    TRIG 16 04/21/2018    TRIG 33 08/02/2017         Health Maintenance:  Health Maintenance   Topic Date Due    HIV SCREENING  1965    CRC Screening: Colonoscopy  1965    DTaP,Tdap,and Td Vaccines (1 - Tdap) 09/03/1987    PAP SMEAR  09/03/1987    MAMMOGRAM  04/13/2018    PT PLAN OF CARE  08/10/2018    INFLUENZA VACCINE  09/01/2018     Immunization History   Administered Date(s) Administered    Hep B, adult 08/31/2011    Influenza 10/01/2013    Influenza TIV (IM) 10/01/2013         Mercy Bethea MD  8/7/2018,11:31 AM

## 2018-08-15 ENCOUNTER — HOSPITAL ENCOUNTER (OUTPATIENT)
Dept: GASTROENTEROLOGY | Facility: HOSPITAL | Age: 53
Discharge: HOME | End: 2018-08-15
Attending: SURGERY
Payer: COMMERCIAL

## 2018-08-15 DIAGNOSIS — K21.00 GASTROESOPHAGEAL REFLUX DISEASE WITH ESOPHAGITIS: ICD-10-CM

## 2018-08-15 DIAGNOSIS — Z98.84 BARIATRIC SURGERY STATUS: ICD-10-CM

## 2018-08-15 PROCEDURE — 91038 ESOPH IMPED FUNCT TEST > 1HR: CPT

## 2018-08-15 NOTE — PERIOPERATIVE NURSING NOTE
PH probe placed at 32 cm using sphincter   Pt taught zephr monitor using teach back  Pt left in NAD

## 2018-08-23 ENCOUNTER — TRANSCRIBE ORDERS (OUTPATIENT)
Dept: LAB | Facility: CLINIC | Age: 53
End: 2018-08-23

## 2018-08-23 ENCOUNTER — APPOINTMENT (OUTPATIENT)
Dept: LAB | Facility: CLINIC | Age: 53
End: 2018-08-23
Payer: COMMERCIAL

## 2018-08-23 DIAGNOSIS — I10 BENIGN ESSENTIAL HYPERTENSION: ICD-10-CM

## 2018-08-23 DIAGNOSIS — Z13.1 SCREENING FOR DIABETES MELLITUS: ICD-10-CM

## 2018-08-23 DIAGNOSIS — Z98.84 BARIATRIC SURGERY STATUS: ICD-10-CM

## 2018-08-23 DIAGNOSIS — R79.89 LOW SERUM VITAMIN A: ICD-10-CM

## 2018-08-23 LAB
ALBUMIN SERPL BCP-MCNC: 3.6 G/DL (ref 3.5–5)
ALP SERPL-CCNC: 66 U/L (ref 46–116)
ALT SERPL W P-5'-P-CCNC: 19 U/L (ref 12–78)
ANION GAP SERPL CALCULATED.3IONS-SCNC: 6 MMOL/L (ref 4–13)
AST SERPL W P-5'-P-CCNC: 13 U/L (ref 5–45)
BASOPHILS # BLD AUTO: 0.02 THOUSANDS/ΜL (ref 0–0.1)
BASOPHILS NFR BLD AUTO: 0 % (ref 0–1)
BILIRUB SERPL-MCNC: 0.86 MG/DL (ref 0.2–1)
BUN SERPL-MCNC: 17 MG/DL (ref 5–25)
CALCIUM SERPL-MCNC: 9.1 MG/DL (ref 8.3–10.1)
CHLORIDE SERPL-SCNC: 107 MMOL/L (ref 100–108)
CHOLEST SERPL-MCNC: 161 MG/DL (ref 50–200)
CO2 SERPL-SCNC: 28 MMOL/L (ref 21–32)
CREAT SERPL-MCNC: 0.7 MG/DL (ref 0.6–1.3)
EOSINOPHIL # BLD AUTO: 0.2 THOUSAND/ΜL (ref 0–0.61)
EOSINOPHIL NFR BLD AUTO: 4 % (ref 0–6)
ERYTHROCYTE [DISTWIDTH] IN BLOOD BY AUTOMATED COUNT: 12.8 % (ref 11.6–15.1)
EST. AVERAGE GLUCOSE BLD GHB EST-MCNC: 111 MG/DL
GFR SERPL CREATININE-BSD FRML MDRD: 116 ML/MIN/1.73SQ M
GLUCOSE P FAST SERPL-MCNC: 84 MG/DL (ref 65–99)
HBA1C MFR BLD: 5.5 % (ref 4.2–6.3)
HCT VFR BLD AUTO: 39.9 % (ref 34.8–46.1)
HDLC SERPL-MCNC: 79 MG/DL (ref 40–60)
HGB BLD-MCNC: 12.7 G/DL (ref 11.5–15.4)
IMM GRANULOCYTES # BLD AUTO: 0.01 THOUSAND/UL (ref 0–0.2)
IMM GRANULOCYTES NFR BLD AUTO: 0 % (ref 0–2)
LDLC SERPL CALC-MCNC: 77 MG/DL (ref 0–100)
LYMPHOCYTES # BLD AUTO: 1.63 THOUSANDS/ΜL (ref 0.6–4.47)
LYMPHOCYTES NFR BLD AUTO: 31 % (ref 14–44)
MCH RBC QN AUTO: 29.6 PG (ref 26.8–34.3)
MCHC RBC AUTO-ENTMCNC: 31.8 G/DL (ref 31.4–37.4)
MCV RBC AUTO: 93 FL (ref 82–98)
MONOCYTES # BLD AUTO: 0.37 THOUSAND/ΜL (ref 0.17–1.22)
MONOCYTES NFR BLD AUTO: 7 % (ref 4–12)
NEUTROPHILS # BLD AUTO: 3.09 THOUSANDS/ΜL (ref 1.85–7.62)
NEUTS SEG NFR BLD AUTO: 58 % (ref 43–75)
NONHDLC SERPL-MCNC: 82 MG/DL
NRBC BLD AUTO-RTO: 0 /100 WBCS
PLATELET # BLD AUTO: 243 THOUSANDS/UL (ref 149–390)
PMV BLD AUTO: 11.7 FL (ref 8.9–12.7)
POTASSIUM SERPL-SCNC: 3.9 MMOL/L (ref 3.5–5.3)
PROT SERPL-MCNC: 7.3 G/DL (ref 6.4–8.2)
RBC # BLD AUTO: 4.29 MILLION/UL (ref 3.81–5.12)
SODIUM SERPL-SCNC: 141 MMOL/L (ref 136–145)
TRIGL SERPL-MCNC: 26 MG/DL
TSH SERPL DL<=0.05 MIU/L-ACNC: 1.22 UIU/ML (ref 0.36–3.74)
WBC # BLD AUTO: 5.32 THOUSAND/UL (ref 4.31–10.16)

## 2018-08-23 PROCEDURE — 83036 HEMOGLOBIN GLYCOSYLATED A1C: CPT

## 2018-08-23 PROCEDURE — 80053 COMPREHEN METABOLIC PANEL: CPT

## 2018-08-23 PROCEDURE — 36415 COLL VENOUS BLD VENIPUNCTURE: CPT

## 2018-08-23 PROCEDURE — 85025 COMPLETE CBC W/AUTO DIFF WBC: CPT

## 2018-08-23 PROCEDURE — 84443 ASSAY THYROID STIM HORMONE: CPT

## 2018-08-23 PROCEDURE — 84590 ASSAY OF VITAMIN A: CPT

## 2018-08-23 PROCEDURE — 80061 LIPID PANEL: CPT

## 2018-08-24 PROCEDURE — 91038 ESOPH IMPED FUNCT TEST > 1HR: CPT | Performed by: INTERNAL MEDICINE

## 2018-08-26 LAB — VIT A SERPL-MCNC: 36.9 UG/DL (ref 33.1–100)

## 2018-08-28 PROCEDURE — G8978 MOBILITY CURRENT STATUS: HCPCS

## 2018-08-28 PROCEDURE — G8979 MOBILITY GOAL STATUS: HCPCS

## 2018-08-31 ENCOUNTER — APPOINTMENT (OUTPATIENT)
Dept: PHYSICAL THERAPY | Facility: CLINIC | Age: 53
End: 2018-08-31
Payer: COMMERCIAL

## 2018-09-04 ENCOUNTER — OFFICE VISIT (OUTPATIENT)
Dept: PHYSICAL THERAPY | Facility: CLINIC | Age: 53
End: 2018-09-04
Payer: COMMERCIAL

## 2018-09-04 DIAGNOSIS — M17.11 PATELLOFEMORAL ARTHRITIS OF RIGHT KNEE: ICD-10-CM

## 2018-09-04 DIAGNOSIS — M22.2X1 PATELLOFEMORAL PAIN SYNDROME OF RIGHT KNEE: Primary | ICD-10-CM

## 2018-09-04 DIAGNOSIS — M17.11 PRIMARY OSTEOARTHRITIS OF RIGHT KNEE: ICD-10-CM

## 2018-09-04 PROCEDURE — 97112 NEUROMUSCULAR REEDUCATION: CPT | Performed by: PHYSICAL THERAPIST

## 2018-09-04 PROCEDURE — 97110 THERAPEUTIC EXERCISES: CPT | Performed by: PHYSICAL THERAPIST

## 2018-09-04 NOTE — PROGRESS NOTES
Daily Note     Today's date: 2018  Patient name: Valery Nino  : 1965  MRN: [id number]  Referring provider: Cora Dominique DO  Dx:   Encounter Diagnosis     ICD-10-CM    1  Patellofemoral pain syndrome of right knee M22 2X1    2  Patellofemoral arthritis of right knee M17 11    3  Primary osteoarthritis of right knee M17 11        Start Time: 1248  Stop Time: 1334  Total time in clinic (min): 46 minutes    Subjective: Pt reports "there are good days and bad days" when asked about knee pain  Objective: See treatment diary below  Precautions: N/A     Daily Treatment Diary      Manual             R knee PROM    8'  8' NP  8 min  NP                                                                                                                 Exercise Diary             Bike    8'  8' 5'  10 min 10'           Ecc LAQ    2x10  3x10 2# 30x  2# 30x 2# 30x           Leg Press    80# 2x10  80# 3x0 100# 20x  100# 40x 100# 20x           Hamstring curls    2x10  3x10 2# 20x  2# 20x 2# 20x           Psoas stretch    30"x3  30" x4 30"x3  30" x3 NP           Prone quad stretch    30"x3  30" x4    30" x4 NP           Squats    2x10  3x10 20x  20x 20x           Hip 3-way       20x RTB  20x RTB 2# 20xea           SLR 3-way       2# 20x  2# 20x NP           Clamshells           GTB 20x           SLR with ER           20x           TKE with TB           GTB 20x                                                                                                                                                                                                                 Modalities                                                                                                      Assessment: Pt able to tolerate new TE and NMR exercises without an increase in knee pain  Min VC and TC required for proper knee alignment and avoid genu valgus        Plan: Progress as tolerated

## 2018-09-19 PROCEDURE — G8978 MOBILITY CURRENT STATUS: HCPCS | Performed by: PHYSICAL THERAPIST

## 2018-09-19 PROCEDURE — G8979 MOBILITY GOAL STATUS: HCPCS | Performed by: PHYSICAL THERAPIST

## 2018-09-20 NOTE — PROGRESS NOTES
PT Discharge    Today's date: 2018  Patient name: Concha Page  : 1965  MRN: [id number]  Referring provider: Олег Cabello DO  Dx:   Encounter Diagnosis     ICD-10-CM    1  Patellofemoral pain syndrome of right knee M22 2X1    2  Patellofemoral arthritis of right knee M17 11    3  Primary osteoarthritis of right knee M17 11        Start Time: 1248  Stop Time: 1334  Total time in clinic (min): 46 minutes    Assessment  Impairments: abnormal gait, abnormal or restricted ROM, activity intolerance, impaired balance, impaired physical strength, lacks appropriate home exercise program and pain with function  Functional limitations: gait, stairs  Assessment details: Pt stopped attending physical therapy, therefore all objective measures from IE  Pt DC from skilled PT  Understanding of Dx/Px/POC: good   Prognosis: good    Goals  STG: NOT MET  1  Pt will demonstrate independence with HEP  2  Pt will improve R knee flexion A/PROM by at least 10%  3  Pt will improve SLS to at least 5s without BUE support  4  Pt will improve BLE by at least 1/2 grade  5  Pt will have a 10% decrease in R ankle girth    LTG: NOT MET  1  Pt will improve B knee flexion by at least 10%  2  Pt will improve BLE strength to at least 4+/5  3  Pt will be able to ascend/descend stairs with step over step gait pattern  4  Pt will be able to perform SLS on foam surface for at least 30s to show improved balance  5  Pt will have (-) patellar compression test  6   Pt will report pain no more than 2/10      Plan  Patient would benefit from: skilled physical therapy  Planned modality interventions: cryotherapy and thermotherapy: hydrocollator packs  Planned therapy interventions: therapeutic exercise, therapeutic activities, stretching, strengthening, patient education, neuromuscular re-education, massage, manual therapy, balance, gait training and home exercise program  Treatment plan discussed with: patient  Plan details: DC from skilled PT        Subjective Evaluation    History of Present Illness  Mechanism of injury: Pt stopped attending physical therapy, no new subjective complaints obtained  Quality of life: good    Pain  Current pain ratin  At best pain ratin  At worst pain rating: 10  Aggravating factors: walking and stair climbing      Diagnostic Tests  X-ray: abnormal (arthritis and effusion)  Patient Goals  Patient goals for therapy: decreased edema, decreased pain, independence with ADLs/IADLs and increased strength          Objective     Tenderness     Additional Tenderness Details  No TTP noted    Active Range of Motion   Left Knee   Flexion: 105 degrees   Extension: 0 degrees     Right Knee   Flexion: 105 degrees   Extension: 0 degrees   Left Ankle/Foot   Dorsiflexion (ke): 5 degrees     Right Ankle/Foot   Dorsiflexion (ke): 7 degrees     Passive Range of Motion   Left Knee   Flexion: 110 degrees   Extension: 0 degrees     Right Knee   Flexion: 109 degrees   Extension: 0 degrees   Left Ankle/Foot    Dorsiflexion (ke): 10 degrees     Right Ankle/Foot    Dorsiflexion (ke): 10 degrees     Strength/Myotome Testing     Left Knee   Normal strength    Right Knee   Flexion: 4  Extension: 4+    Tests     Right Knee   Positive patellar compression  Negative lateral Bogdan, medial Bogdan, valgus stress test at 30 degrees and varus stress test at 30 degrees  Swelling     Left Knee Girth Measurement (cm)   Joint line: 44 cm    Right Knee Girth Measurement (cm)   Joint line: 44 cm  Left Ankle/Foot   Figure 8: 51 cm    Right Ankle/Foot   Figure 8: 54 cm    Ambulation     Observational Gait     Additional Observational Gait Details  Pt ambulates with decreased stance time on the R which causes decreased step length on the L  Functional Assessment     Comments  FSU 6": Increased lateral lean bilaterally  Eccentric step down 6":  Unable to control lowering with quad bilaterally    SLS: 1s RLE, 3s LLE  Tandem: 5s    General Comments Knee Comments  Hamstring flexibility: no restriction  R hip flexor flexbility: moderate restriction      Flowsheet Rows      Most Recent Value   PT/OT G-Codes   Current Score  35   Projected Score  59   FOTO information reviewed  No   Assessment Type  Re-evaluation [G-code update only]   G code set  Mobility: Walking & Moving Around   Mobility: Walking and Moving Around Current Status ()  CJ   Mobility: Walking and Moving Around Goal Status ()  CJ

## 2018-09-21 ENCOUNTER — OFFICE VISIT (OUTPATIENT)
Dept: OBGYN CLINIC | Facility: CLINIC | Age: 53
End: 2018-09-21
Payer: COMMERCIAL

## 2018-09-21 ENCOUNTER — APPOINTMENT (OUTPATIENT)
Dept: RADIOLOGY | Facility: CLINIC | Age: 53
End: 2018-09-21
Payer: COMMERCIAL

## 2018-09-21 VITALS
HEIGHT: 64 IN | SYSTOLIC BLOOD PRESSURE: 152 MMHG | BODY MASS INDEX: 30.73 KG/M2 | WEIGHT: 180 LBS | DIASTOLIC BLOOD PRESSURE: 84 MMHG

## 2018-09-21 DIAGNOSIS — M17.12 PRIMARY OSTEOARTHRITIS OF LEFT KNEE: ICD-10-CM

## 2018-09-21 DIAGNOSIS — M17.11 PRIMARY OSTEOARTHRITIS OF RIGHT KNEE: Primary | ICD-10-CM

## 2018-09-21 DIAGNOSIS — G89.29 CHRONIC PAIN OF LEFT KNEE: ICD-10-CM

## 2018-09-21 DIAGNOSIS — M25.562 CHRONIC PAIN OF LEFT KNEE: ICD-10-CM

## 2018-09-21 PROCEDURE — 73562 X-RAY EXAM OF KNEE 3: CPT

## 2018-09-21 PROCEDURE — 99213 OFFICE O/P EST LOW 20 MIN: CPT | Performed by: FAMILY MEDICINE

## 2018-09-21 RX ORDER — BUSPIRONE HYDROCHLORIDE 10 MG/1
TABLET ORAL
COMMUNITY
End: 2018-10-11 | Stop reason: SDUPTHER

## 2018-09-21 NOTE — PROGRESS NOTES
Assessment/Plan:  Assessment/Plan   Diagnoses and all orders for this visit:    Primary osteoarthritis of right knee  -     Injection procedure prior authorization; Future  -     Brace    Primary osteoarthritis of left knee  -     Injection procedure prior authorization; Future  -     Brace    Chronic pain of left knee  -     XR knee 3 vw left non injury; Future    Other orders  -     busPIRone (BUSPAR) 10 mg tablet; buspirone 10 mg tablet     60-year-old female with osteoarthritis of both knees  Discussed with patient physical exam, radiographs, impression, and plan  Review of x-rays of both knee noted for significant tricompartmental osteoarthritis worse at medial joint spaces and patellofemoral spaces  Physical exam is noted for mild swelling of both knees, tenderness lateral joint line of both knees, crepitus, and pain with patellar grind, and laxity with valgus testing  I discussed treatment regimen of continue with joint supplement, and I have her start wearing off  knee braces, and we will submit for viscosupplementation to which she agreed  Subjective:   Patient ID: Nick York is a 47 y o  female  Chief Complaint   Patient presents with    Right Knee - Follow-up    Left Knee - Pain       60-year-old female following up for pain of both knees  She was last seen 3 months ago at which point she was started on meloxicam 15 mg and referred to physical therapy for osteoarthritis and patellofemoral syndrome  Today she reports that she has been taking meloxicam 15 mg and doing physical therapy as directed  She still continues have pain of both knees right worse than the left described as localized mainly to the anterior lateral aspect, constant, achy and sometimes sharp, worse with ambulation and twisting, and associated with swelling  She had also started to tumeric supplement  She reports that sometimes she takes meloxicam twice daily due to the pain which will provide only mild relief    Pain both knees have been getting worse  Her previous corticosteroid injection provided improved pain for only 1 month  Knee Pain   This is a chronic problem  The current episode started more than 1 month ago  The problem occurs constantly  The problem has been gradually worsening  Associated symptoms include arthralgias and joint swelling  Pertinent negatives include no numbness or weakness  The symptoms are aggravated by standing, twisting and walking  She has tried rest and NSAIDs (Corticosteroid injection, physical therapy) for the symptoms  The treatment provided mild relief  Review of Systems   Musculoskeletal: Positive for arthralgias and joint swelling  Neurological: Negative for weakness and numbness  Objective:  Vitals:    09/21/18 1120   BP: 152/84   Weight: 81 6 kg (180 lb)   Height: 5' 4" (1 626 m)     Right Knee Exam     Tenderness   The patient is experiencing tenderness in the lateral joint line  Range of Motion   Extension: normal   Flexion: 130     Muscle Strength     The patient has normal right knee strength  Tests   Varus: negative  Right knee positive valgus stress test: Laxity, no tenderness  Other   Swelling: mild  Other tests: no effusion present    Comments:  Pain with patellar grind  Crepitus      Left Knee Exam     Tenderness   The patient is experiencing tenderness in the lateral joint line  Range of Motion   Extension: normal   Flexion: 130     Muscle Strength     The patient has normal left knee strength  Tests   Varus: negative  Left knee positive valgus stress test:  laxity, no tenderness  Other   Swelling: mild  Effusion: no effusion present    Comments:  Pain with patellar grind  Crepitus          Observations   Left Knee   Negative for effusion  Right Knee   Negative for effusion  Physical Exam   Constitutional: She is oriented to person, place, and time  She appears well-developed  No distress  HENT:   Head: Normocephalic  Eyes: Conjunctivae are normal    Neck: No tracheal deviation present  Pulmonary/Chest: Effort normal  No respiratory distress  Abdominal: She exhibits no distension  Musculoskeletal:        Right knee: She exhibits no effusion  Left knee: She exhibits no effusion  Neurological: She is alert and oriented to person, place, and time  Skin: Skin is warm and dry  Psychiatric: She has a normal mood and affect  Her behavior is normal    Nursing note and vitals reviewed  I have personally reviewed pertinent films in PACS and my interpretation is Tricompartmental osteoarthritis of both knees, worse at the medial joint spaces, and patellofemoral spaces

## 2018-09-27 DIAGNOSIS — M17.12 PRIMARY OSTEOARTHRITIS OF LEFT KNEE: Primary | ICD-10-CM

## 2018-09-27 DIAGNOSIS — M17.11 PRIMARY OSTEOARTHRITIS OF RIGHT KNEE: ICD-10-CM

## 2018-09-28 ENCOUNTER — OFFICE VISIT (OUTPATIENT)
Dept: BARIATRICS | Facility: CLINIC | Age: 53
End: 2018-09-28
Payer: COMMERCIAL

## 2018-09-28 VITALS
RESPIRATION RATE: 18 BRPM | TEMPERATURE: 98 F | HEART RATE: 78 BPM | DIASTOLIC BLOOD PRESSURE: 60 MMHG | SYSTOLIC BLOOD PRESSURE: 114 MMHG | HEIGHT: 62 IN | BODY MASS INDEX: 34.04 KG/M2 | WEIGHT: 185 LBS

## 2018-09-28 DIAGNOSIS — Z98.84 BARIATRIC SURGERY STATUS: Primary | ICD-10-CM

## 2018-09-28 DIAGNOSIS — K21.9 GASTROESOPHAGEAL REFLUX DISEASE WITHOUT ESOPHAGITIS: ICD-10-CM

## 2018-09-28 DIAGNOSIS — Z90.3 POSTGASTRECTOMY MALABSORPTION: ICD-10-CM

## 2018-09-28 DIAGNOSIS — K91.2 POSTGASTRECTOMY MALABSORPTION: ICD-10-CM

## 2018-09-28 PROCEDURE — 99213 OFFICE O/P EST LOW 20 MIN: CPT | Performed by: SURGERY

## 2018-09-28 NOTE — PROGRESS NOTES
FOLLOW UP VISIT - BARIATRIC SURGERY  Kinjal Macedo 47 y o  female MRN: [id number]  Unit/Bed#:  Encounter: [id number]      HPI:  Kinjal Macedo is a 47 y o  female who presents with a history of sleeve gastrectomy in January of 2015  Here for scheduled follow-up to discuss results of her pH study  Review of Systems   All other systems reviewed and are negative  GI: Severe heartburn  Historical Information   Past Medical History:   Diagnosis Date    Depression 11/15/2013    GERD (gastroesophageal reflux disease)     Obese     Obstructive sleep apnea     Last Assessed: 11Aug2015    Vitamin D deficiency     Last Assessed: 11Aug2015     Past Surgical History:   Procedure Laterality Date    ABDOMINAL SURGERY      ESOPHAGOGASTRODUODENOSCOPY      ESOPHAGOGASTRODUODENOSCOPY N/A 5/24/2018    Procedure: EGD with biopsy;  Surgeon: Erin Fernandez MD;  Location: [facility]; Service: [place]      Gastric Surgery for Morbid Obesity Laparoscopic Longitudinal Gastrectomy: Onset: [id number]    GASTRIC RESTRICTION SURGERY      for Morbid Obesity; Last Assessed: [id number]    HERNIA REPAIR      HIATAL HERNIA REPAIR      PARAESOPHAGEAL HERNIA REPAIR      Hiatus Hernia;  Onset: [id number]    STOMACH SURGERY      TUBAL LIGATION      TUBAL LIGATION       Social History   History   Alcohol Use No     History   Drug Use No     History   Smoking Status    Never Smoker   Smokeless Tobacco    Never Used     Family History: non-contributory    Meds/Allergies   all medications and allergies reviewed  Allergies   Allergen Reactions    Nickel        Objective       Current Vitals:   Blood Pressure: 114/60 (09/28/18 1113)  Pulse: 78 (09/28/18 1113)  Temperature: 98 °F (36 7 °C) (09/28/18 1113)  Respirations: 18 (09/28/18 1113)  Height: 5' 2" (157 5 cm) (09/28/18 1113)  Weight - Scale: 83 9 kg (185 lb) (09/28/18 1113)        Invasive Devices          No matching active lines, drains, or airways Physical Exam   Constitutional: She is oriented to person, place, and time  She appears well-developed and well-nourished  No distress  Neurological: She is alert and oriented to person, place, and time  Psychiatric: She has a normal mood and affect  Her behavior is normal  Judgment and thought content normal    Vitals reviewed  Lab Results: I have personally reviewed pertinent lab results  Imaging: I have personally reviewed pertinent reports  EKG, Pathology, and Other Studies: I have personally reviewed pertinent reports  Her ambulatory pH study that was done on August 15th of 2008 revealed a prolonged acid exposure time with 6 1 % overall  Acid exposure time was 5 7% on upright position and 6 4% during recumbent position  The DeMeester score was 21 6  There were 77 episodes of reflux during the study with all the episodes being acid or weekly acid 70 episodes during upright positions 7 episodes during recumbent position  This study was positive for significant acid reflux    Assessment/PLAN:    47 y o  female with a history of laparoscopic sleeve gastrectomy in January of 2015  She started at 220 lb and at one point she lost 56% excess weight loss and she went down to 171  She has developed severe heartburn and as a result of these she has not been observing her diet as well resulting on her regaining some weight back      I have reviewed the upper GI that showed severe gastroesophageal reflux with a question of a ulceration near the GE junction  I scheduled her to have an endoscopy with a potential Bravo probe placement to further assess this acid reflux  Unfortunately she has a nickel allergy and the Bravo test was not performed  The EGD revealed esophagitis LA grade B  I center for an ambulatory pH study the revealed a 6 1% overall acid exposure time and at the Mr  score of 21 6  The study was positive for significant amount of acid reflux      Her symptomatology is refractory to treatment and to conservative measures and is pre debilitating  We had a discussion about potentially converting this to a gastric bypass but she is not interested in pursuing surgery at this point  The other option that we have is a Stretta procedure to decrease the reflux through radiofrequency ablation  She is interested in pursuing that option before an operation and our office will schedule her to have this done                           She will follow up with us as scheduled      Ángel Villatoro MD  9/28/2018  11:24 AM

## 2018-10-05 ENCOUNTER — TELEPHONE (OUTPATIENT)
Dept: INTERNAL MEDICINE CLINIC | Facility: CLINIC | Age: 53
End: 2018-10-05

## 2018-10-05 DIAGNOSIS — Z00.00 ENCOUNTER FOR WELLNESS EXAMINATION IN ADULT: ICD-10-CM

## 2018-10-05 DIAGNOSIS — R76.11 POSITIVE PPD: Primary | ICD-10-CM

## 2018-10-05 NOTE — TELEPHONE ENCOUNTER
Pt would like a call back from dr bull she wanted to get additional lab work done did not want to tell me what it was for wants dr bull to speak with her

## 2018-10-09 ENCOUNTER — HOSPITAL ENCOUNTER (OUTPATIENT)
Dept: RADIOLOGY | Facility: HOSPITAL | Age: 53
Discharge: HOME | End: 2018-10-09
Attending: INTERNAL MEDICINE
Payer: COMMERCIAL

## 2018-10-09 ENCOUNTER — TRANSCRIBE ORDERS (OUTPATIENT)
Dept: ADMINISTRATIVE | Facility: HOSPITAL | Age: 53
End: 2018-10-09

## 2018-10-09 ENCOUNTER — APPOINTMENT (OUTPATIENT)
Dept: LAB | Facility: HOSPITAL | Age: 53
End: 2018-10-09
Attending: INTERNAL MEDICINE
Payer: COMMERCIAL

## 2018-10-09 DIAGNOSIS — R76.11 POSITIVE PPD: ICD-10-CM

## 2018-10-09 DIAGNOSIS — Z01.812 PRE-PROCEDURE LAB EXAM: Primary | ICD-10-CM

## 2018-10-09 DIAGNOSIS — Z01.812 PRE-PROCEDURE LAB EXAM: ICD-10-CM

## 2018-10-09 LAB
ANION GAP SERPL CALCULATED.3IONS-SCNC: 5 MMOL/L (ref 4–13)
BUN SERPL-MCNC: 19 MG/DL (ref 5–25)
CALCIUM SERPL-MCNC: 9.4 MG/DL (ref 8.3–10.1)
CHLORIDE SERPL-SCNC: 102 MMOL/L (ref 100–108)
CO2 SERPL-SCNC: 33 MMOL/L (ref 21–32)
CREAT SERPL-MCNC: 0.72 MG/DL (ref 0.6–1.3)
ERYTHROCYTE [DISTWIDTH] IN BLOOD BY AUTOMATED COUNT: 12.7 % (ref 11.6–15.1)
GFR SERPL CREATININE-BSD FRML MDRD: 111 ML/MIN/1.73SQ M
GLUCOSE SERPL-MCNC: 70 MG/DL (ref 65–140)
HCT VFR BLD AUTO: 41 % (ref 34.8–46.1)
HGB BLD-MCNC: 13.5 G/DL (ref 11.5–15.4)
INR PPP: 1.02 (ref 0.86–1.17)
MCH RBC QN AUTO: 30.7 PG (ref 26.8–34.3)
MCHC RBC AUTO-ENTMCNC: 32.9 G/DL (ref 31.4–37.4)
MCV RBC AUTO: 93 FL (ref 82–98)
PLATELET # BLD AUTO: 261 THOUSANDS/UL (ref 149–390)
PMV BLD AUTO: 10.5 FL (ref 8.9–12.7)
POTASSIUM SERPL-SCNC: 3.9 MMOL/L (ref 3.5–5.3)
PROTHROMBIN TIME: 13.3 SECONDS (ref 11.8–14.2)
RBC # BLD AUTO: 4.4 MILLION/UL (ref 3.81–5.12)
SODIUM SERPL-SCNC: 140 MMOL/L (ref 136–145)
WBC # BLD AUTO: 6.9 THOUSAND/UL (ref 4.31–10.16)

## 2018-10-09 PROCEDURE — 36415 COLL VENOUS BLD VENIPUNCTURE: CPT

## 2018-10-09 PROCEDURE — 71046 X-RAY EXAM CHEST 2 VIEWS: CPT

## 2018-10-09 PROCEDURE — 85027 COMPLETE CBC AUTOMATED: CPT

## 2018-10-09 PROCEDURE — 80307 DRUG TEST PRSMV CHEM ANLYZR: CPT | Performed by: INTERNAL MEDICINE

## 2018-10-09 PROCEDURE — 85610 PROTHROMBIN TIME: CPT

## 2018-10-09 PROCEDURE — 80048 BASIC METABOLIC PNL TOTAL CA: CPT

## 2018-10-10 LAB
AMPHETAMINES UR QL SCN: NEGATIVE NG/ML
BARBITURATES UR QL SCN: NEGATIVE NG/ML
BENZODIAZ UR QL SCN: NEGATIVE NG/ML
BZE UR QL: NEGATIVE NG/ML
CANNABINOIDS UR QL SCN: NEGATIVE NG/ML
METHADONE UR QL SCN: NEGATIVE NG/ML
OPIATES UR QL: NEGATIVE NG/ML
PCP UR QL: NEGATIVE NG/ML
PROPOXYPH UR QL: NEGATIVE NG/ML

## 2018-10-11 ENCOUNTER — OFFICE VISIT (OUTPATIENT)
Dept: INTERNAL MEDICINE CLINIC | Facility: CLINIC | Age: 53
End: 2018-10-11
Payer: COMMERCIAL

## 2018-10-11 VITALS
BODY MASS INDEX: 34.12 KG/M2 | HEIGHT: 62 IN | HEART RATE: 80 BPM | OXYGEN SATURATION: 96 % | DIASTOLIC BLOOD PRESSURE: 90 MMHG | SYSTOLIC BLOOD PRESSURE: 150 MMHG | WEIGHT: 185.4 LBS

## 2018-10-11 DIAGNOSIS — F41.1 GENERALIZED ANXIETY DISORDER: ICD-10-CM

## 2018-10-11 DIAGNOSIS — I10 BENIGN ESSENTIAL HYPERTENSION: Primary | ICD-10-CM

## 2018-10-11 DIAGNOSIS — M17.11 PATELLOFEMORAL ARTHRITIS OF RIGHT KNEE: ICD-10-CM

## 2018-10-11 DIAGNOSIS — E66.9 OBESITY (BMI 30-39.9): ICD-10-CM

## 2018-10-11 DIAGNOSIS — Q66.89 TARSAL COALITION OF RIGHT FOOT: ICD-10-CM

## 2018-10-11 DIAGNOSIS — M17.11 PRIMARY OSTEOARTHRITIS OF RIGHT KNEE: ICD-10-CM

## 2018-10-11 DIAGNOSIS — E55.9 VITAMIN D DEFICIENCY: ICD-10-CM

## 2018-10-11 DIAGNOSIS — M25.561 ARTHRALGIA OF BOTH KNEES: ICD-10-CM

## 2018-10-11 DIAGNOSIS — Z98.84 BARIATRIC SURGERY STATUS: ICD-10-CM

## 2018-10-11 DIAGNOSIS — M25.562 ARTHRALGIA OF BOTH KNEES: ICD-10-CM

## 2018-10-11 PROBLEM — K21.9 GASTROESOPHAGEAL REFLUX DISEASE: Status: RESOLVED | Noted: 2018-09-28 | Resolved: 2018-10-11

## 2018-10-11 PROCEDURE — 99214 OFFICE O/P EST MOD 30 MIN: CPT | Performed by: INTERNAL MEDICINE

## 2018-10-11 RX ORDER — BUSPIRONE HYDROCHLORIDE 10 MG/1
10 TABLET ORAL 2 TIMES DAILY
Qty: 180 TABLET | Refills: 1 | Status: SHIPPED | OUTPATIENT
Start: 2018-10-11 | End: 2019-02-04

## 2018-10-11 RX ORDER — TRAMADOL HYDROCHLORIDE 50 MG/1
50 TABLET ORAL DAILY PRN
Qty: 30 TABLET | Refills: 0 | Status: SHIPPED | OUTPATIENT
Start: 2018-10-11 | End: 2019-05-30

## 2018-10-11 RX ORDER — MELOXICAM 15 MG/1
15 TABLET ORAL DAILY
Qty: 30 TABLET | Refills: 1 | Status: SHIPPED | OUTPATIENT
Start: 2018-10-11 | End: 2018-11-26 | Stop reason: SDUPTHER

## 2018-10-11 NOTE — PROGRESS NOTES
INTERNAL MEDICINE OFFICE VISIT  [facility] AT [facility] [address], [place], [address] [facility]  Tel: [phone]      NAME: Holly Harris  AGE: 47 y o  SEX: female  : 1965   MRN: [id number]    DATE: 10/11/2018  TIME: 9:35 AM      Assessment and Plan:  1  Benign essential hypertension  Her blood pressure is high today but she says it is because she is in a lot of pain  She was told to monitor blood pressure at home and let me know if it remains high     - CBC and differential; Future  - Comprehensive metabolic panel; Future  - Lipid panel; Future  - TSH, 3rd generation; Future    2  Generalized anxiety disorder  Continue BuSpar  - busPIRone (BUSPAR) 10 mg tablet; Take 1 tablet (10 mg total) by mouth 2 (two) times a day  Dispense: 180 tablet; Refill: 1    3  Arthralgia of both knees  She was told to follow up with Orthopedics and continue taking the pain medications as needed    4  Tarsal coalition of right foot  She follows up with Podiatry    5  Vitamin D deficiency  Continue vitamin-D    6  Bariatric surgery status  Stable    7  Obesity (BMI 30-39  9)  She was told to try to lose weight      - Counseling Documentation: patient was counseled regarding: diagnostic results, instructions for management, risk factor reductions, prognosis, patient and family education, impressions, risks and benefits of treatment options and importance of compliance with treatment  - Medication Side Effects: Adverse side effects of medications were reviewed with the patient/guardian today  Return for follow up visit in 6 months or earlier, if needed  Chief Complaint:  Chief Complaint   Patient presents with    Knee Pain         History of Present Illness:   Hypertension-blood pressure has been a little on the higher side because she is in pain all the time  He she takes her medications regularly    Anxiety disorder-she has had very good control of symptoms after she was started on BuSpar  Knee pain-she has been following up with the orthopedic doctor and she is due for Synvisc shot in her knees  Right ankle pain-she has been following up with Podiatry and has a surgery scheduled soon  Vitamin-D deficiency-she takes vitamin-D supplement regularly  Obesity-she has not been able to lose weight because of limited mobility  Active Problem List:  Patient Active Problem List   Diagnosis    Arthralgia of both knees    Benign essential hypertension    Edema    Postgastrectomy malabsorption    Osteoarthritis    Vitamin D deficiency    Bariatric surgery status    Gastroesophageal reflux disease without esophagitis    Tarsal coalition of right foot    Generalized anxiety disorder    Positive PPD    Obesity (BMI 30-39  9)         Past Medical History:  Past Medical History:   Diagnosis Date    Depression 11/15/2013    GERD (gastroesophageal reflux disease)     Obese     Obstructive sleep apnea     Last Assessed: 11Aug2015    Vitamin D deficiency     Last Assessed: 11Aug2015         Past Surgical History:  Past Surgical History:   Procedure Laterality Date    ABDOMINAL SURGERY      ESOPHAGOGASTRODUODENOSCOPY      ESOPHAGOGASTRODUODENOSCOPY N/A 5/24/2018    Procedure: EGD with biopsy;  Surgeon: Batsheva Butler MD;  Location: [facility]; Service: [place]      Gastric Surgery for Morbid Obesity Laparoscopic Longitudinal Gastrectomy: Onset: [id number]    GASTRIC RESTRICTION SURGERY      for Morbid Obesity; Last Assessed: [id number]    HERNIA REPAIR      HIATAL HERNIA REPAIR      PARAESOPHAGEAL HERNIA REPAIR      Hiatus Hernia;  Onset: [id number]    STOMACH SURGERY      TUBAL LIGATION      TUBAL LIGATION           Family History:  Family History   Problem Relation Age of Onset    Diabetes Mother     Hypertension Mother     No Known Problems Family          Social History:  Social History     Social History    Marital status: /Civil Union     Spouse name: N/A    Number of children: N/A    Years of education: N/A     Social History Main Topics    Smoking status: Never Smoker    Smokeless tobacco: Never Used    Alcohol use No    Drug use: No    Sexual activity: Not Currently     Birth control/ protection: Surgical     Other Topics Concern    None     Social History Narrative    Caffeine use         Allergies:   Allergies   Allergen Reactions    Nickel          Medications:    Current Outpatient Prescriptions:     acetaminophen (TYLENOL) 325 mg tablet, every 6 (six) hours, Disp: , Rfl:     Biotin 5000 MCG CAPS, Take 1 capsule by mouth daily, Disp: , Rfl:     busPIRone (BUSPAR) 10 mg tablet, Take 1 tablet (10 mg total) by mouth 2 (two) times a day, Disp: 180 tablet, Rfl: 1    Cholecalciferol (VITAMIN D) 2000 units CAPS, Take by mouth daily, Disp: , Rfl:     losartan-hydrochlorothiazide (HYZAAR) 50-12 5 mg per tablet, Take 1 tablet by mouth daily, Disp: 90 tablet, Rfl: 0    meloxicam (MOBIC) 15 mg tablet, Take 1 tablet (15 mg total) by mouth daily, Disp: 30 tablet, Rfl: 0    Multiple Vitamins-Minerals (MULTIVITAMIN ADULT EXTRA C PO), multivitamin, Disp: , Rfl:     Omega-3 Fatty Acids (FISH OIL PO), Take by mouth, Disp: , Rfl:       The following portions of the patient's history were reviewed and updated as appropriate: past medical history, past surgical history, family history, social history, allergies, current medications and active problem list       Review of Systems:  Constitutional: Denies fever, chills, weight gain, weight loss, fatigue  Eyes: Denies eye redness, eye discharge, double vision, change in visual acuity  ENT: Denies hearing loss, tinnitus, sneezing, nasal congestion, nasal discharge, sore throat   Respiratory: Denies cough, expectoration, hemoptysis, shortness of breath, wheezing  Cardiovascular: Denies chest pain, palpitations, lower extremity swelling, orthopnea, PND  Gastrointestinal: Denies abdominal pain, heartburn, nausea, vomiting, hematemesis, diarrhea, bloody stools  Genito-Urinary: Denies dysuria, frequency, difficulty in micturition, nocturia, incontinence  Musculoskeletal: has knee and ankle  joint pain  Neurologic: Denies confusion, lightheadedness, syncope, headache, focal weakness, sensory changes, seizures  Endocrine: Denies polyuria, polydipsia, temperature intolerance  Allergy and Immunology: Denies hives, insect bite sensitivity  Hematological and Lymphatic: Denies bleeding problems, swollen glands   Psychological: Denies depression, suicidal ideation, anxiety, panic, mood swings  Dermatological: Denies pruritus, rash, skin lesion changes      Vitals:  Vitals:    10/11/18 0930   BP: 150/90   Pulse:    SpO2:        Body mass index is 33 91 kg/m²  Weight (last 2 days)     Date/Time   Weight    10/11/18 0917  84 1 (185 4)                Physical Examination:  General: Patient is not in acute distress  Awake, alert, responding to commands  No weight gain or loss  Head: Normocephalic  Atraumatic  Eyes: Conjunctiva and lids with no swelling, erythema or discharge  Both pupils normal sized, round and reactive to light  Sclera nonicteric  ENT: External examination of nose and ear normal  Otoscopic examination shows translucent tympanic membranes with patent canals without erythema  Oropharynx moist with no erythema, edema, exudate or lesions  Neck: Supple  JVP not raised  Trachea midline  No masses  No thyromegaly  Lungs: No signs of increased work of breathing or respiratory distress  Bilateral bronchovascular breath sounds with no crackles or rhonchi  Chest wall: No tenderness  Cardiovascular: Normal PMI  No thrills  Regular rate and rhythm  S1 and S2 normal  No murmur, rub or gallop  Gastrointestinal: Abdomen soft, nontender  No guarding or rigidity  Liver and spleen not palpable  Bowel sounds present  Neurologic: Cranial nerves II-XII intact   Cortical functions normal  Motor system - Reflexes 2+ and symmetrical  Sensations normal  Musculoskeletal: Gait normal  No joint tenderness  Integumentary: Skin normal with no rash or lesions  Lymphatic: No palpable lymph nodes in neck, axilla or groin  Extremities: No clubbing, cyanosis, edema or varicosities  Psychological: Judgement and insight normal  Mood and affect normal      Laboratory Results:  CBC with diff:   Lab Results   Component Value Date    WBC 6 90 10/09/2018    WBC 5 69 07/29/2015    RBC 4 40 10/09/2018    RBC 4 31 07/29/2015    HGB 13 5 10/09/2018    HGB 13 3 07/29/2015    HCT 41 0 10/09/2018    HCT 39 1 07/29/2015    MCV 93 10/09/2018    MCV 91 07/29/2015    MCH 30 7 10/09/2018    MCH 30 9 07/29/2015    RDW 12 7 10/09/2018    RDW 12 9 07/29/2015     10/09/2018     07/29/2015       CMP:  Lab Results   Component Value Date    CREATININE 0 72 10/09/2018    CREATININE 0 85 07/29/2015    BUN 19 10/09/2018    BUN 20 07/29/2015     10/09/2018     07/29/2015    K 3 9 10/09/2018    K 3 8 07/29/2015     10/09/2018     07/29/2015    CO2 33 (H) 10/09/2018    CO2 27 07/29/2015    GLUCOSE 78 07/29/2015    PROT 7 4 07/29/2015    ALKPHOS 66 08/23/2018    ALKPHOS 71 07/29/2015    ALT 19 08/23/2018    ALT 30 07/29/2015    AST 13 08/23/2018    AST 9 07/29/2015       Lab Results   Component Value Date    HGBA1C 5 5 08/23/2018    HGBA1C 4 9 08/31/2015       No results found for: TROPONINI, CKMB, CKTOTAL    Lipid Profile:   Lab Results   Component Value Date    CHOL 148 08/31/2015    CHOL 150 07/29/2015     Lab Results   Component Value Date    HDL 79 (H) 08/23/2018    HDL 80 (H) 04/21/2018     Lab Results   Component Value Date    LDLCALC 77 08/23/2018    LDLCALC 91 04/21/2018     Lab Results   Component Value Date    TRIG 26 08/23/2018    TRIG 16 04/21/2018         Health Maintenance:  Health Maintenance   Topic Date Due    CRC Screening: Colonoscopy  1965    DTaP,Tdap,and Td Vaccines (1 - Tdap) 09/03/1987    PAP SMEAR  09/03/1987    MAMMOGRAM 04/13/2018    INFLUENZA VACCINE  07/01/2018    PT PLAN OF CARE  08/10/2018    Depression Screening PHQ  07/11/2019     Immunization History   Administered Date(s) Administered    Hep B, adult 08/31/2011    Influenza 10/01/2013    Influenza TIV (IM) 10/01/2013         Eder Betancur MD  10/11/2018,9:35 AM

## 2018-10-22 ENCOUNTER — TELEPHONE (OUTPATIENT)
Dept: OBGYN CLINIC | Facility: HOSPITAL | Age: 53
End: 2018-10-22

## 2018-10-22 NOTE — TELEPHONE ENCOUNTER
TO BE COMPLETED BY CENTRAL AUTH TEAM:     Physician: Zaheer Sanches    Medication: SYNVISC     Number of Injections in Series (Appointments scheduled 1 week apart from one another):  >>> 3    Schedule after this date: 3-5 DAYS FROM CALL BACK DATE    Billing Info: Buy and Bill/Specialty Pharmacy-Patient Supply OFFICE [address] [place] [address]     Appointment Message Line: LEFT MSG TO SCHEDULE APPTS FOR VISCO 3 SHOT SERIES     Additional Comments:

## 2018-10-26 DIAGNOSIS — K21.9 GASTROESOPHAGEAL REFLUX DISEASE WITHOUT ESOPHAGITIS: Primary | ICD-10-CM

## 2018-10-26 RX ORDER — OMEPRAZOLE 20 MG/1
20 CAPSULE, DELAYED RELEASE ORAL DAILY
Qty: 30 CAPSULE | Refills: 2 | Status: SHIPPED | OUTPATIENT
Start: 2018-10-26 | End: 2019-11-13

## 2018-10-30 ENCOUNTER — ANESTHESIA EVENT (OUTPATIENT)
Dept: GASTROENTEROLOGY | Facility: HOSPITAL | Age: 53
End: 2018-10-30

## 2018-10-31 ENCOUNTER — ANESTHESIA (OUTPATIENT)
Dept: GASTROENTEROLOGY | Facility: HOSPITAL | Age: 53
End: 2018-10-31

## 2018-10-31 NOTE — ANESTHESIA PREPROCEDURE EVALUATION
Review of Systems/Medical History  Patient summary reviewed  Chart reviewed      Cardiovascular  Exercise tolerance (METS): >4,  Hypertension ,    Pulmonary  Negative pulmonary ROS        GI/Hepatic    GERD poorly controlled,        Negative  ROS        Endo/Other  Negative endo/other ROS      GYN  Negative gynecology ROS          Hematology   Musculoskeletal    Arthritis     Neurology  Negative neurology ROS      Psychology   Anxiety, Depression ,              Physical Exam    Airway    Mallampati score: II  TM Distance: <3 FB  Neck ROM: full     Dental       Cardiovascular  Rhythm: regular, Rate: normal,     Pulmonary  Breath sounds clear to auscultation,     Other Findings        Anesthesia Plan  ASA Score- 3     Anesthesia Type- IV sedation with anesthesia with ASA Monitors  Additional Monitors:   Airway Plan:         Plan Factors- Patient instructed to abstain from smoking on day of procedure  Patient did not smoke on day of surgery  Induction- intravenous  Postoperative Plan-     Informed Consent- Anesthetic plan and risks discussed with patient

## 2018-11-12 ENCOUNTER — OFFICE VISIT (OUTPATIENT)
Dept: OBGYN CLINIC | Facility: CLINIC | Age: 53
End: 2018-11-12
Payer: COMMERCIAL

## 2018-11-12 VITALS
DIASTOLIC BLOOD PRESSURE: 93 MMHG | HEIGHT: 62 IN | SYSTOLIC BLOOD PRESSURE: 160 MMHG | BODY MASS INDEX: 34.23 KG/M2 | HEART RATE: 66 BPM | WEIGHT: 186 LBS

## 2018-11-12 DIAGNOSIS — M17.11 PRIMARY OSTEOARTHRITIS OF RIGHT KNEE: Primary | ICD-10-CM

## 2018-11-12 DIAGNOSIS — M17.12 PRIMARY OSTEOARTHRITIS OF LEFT KNEE: ICD-10-CM

## 2018-11-12 PROCEDURE — 20610 DRAIN/INJ JOINT/BURSA W/O US: CPT | Performed by: FAMILY MEDICINE

## 2018-11-12 RX ORDER — BUPIVACAINE HYDROCHLORIDE 2.5 MG/ML
1 INJECTION, SOLUTION INFILTRATION; PERINEURAL
Status: COMPLETED | OUTPATIENT
Start: 2018-11-12 | End: 2018-11-12

## 2018-11-12 RX ORDER — BUPIVACAINE HYDROCHLORIDE 2.5 MG/ML
2 INJECTION, SOLUTION INFILTRATION; PERINEURAL
Status: COMPLETED | OUTPATIENT
Start: 2018-11-12 | End: 2018-11-12

## 2018-11-12 RX ADMIN — BUPIVACAINE HYDROCHLORIDE 1 ML: 2.5 INJECTION, SOLUTION INFILTRATION; PERINEURAL at 12:07

## 2018-11-12 RX ADMIN — BUPIVACAINE HYDROCHLORIDE 2 ML: 2.5 INJECTION, SOLUTION INFILTRATION; PERINEURAL at 12:09

## 2018-11-12 RX ADMIN — BUPIVACAINE HYDROCHLORIDE 1 ML: 2.5 INJECTION, SOLUTION INFILTRATION; PERINEURAL at 12:09

## 2018-11-12 RX ADMIN — BUPIVACAINE HYDROCHLORIDE 2 ML: 2.5 INJECTION, SOLUTION INFILTRATION; PERINEURAL at 12:07

## 2018-11-12 NOTE — PROGRESS NOTES
Assessment/Plan:  Assessment/Plan   Diagnoses and all orders for this visit:    Primary osteoarthritis of right knee  -     Large joint arthrocentesis    Primary osteoarthritis of left knee  -     Large joint arthrocentesis      56-year-old female with osteoarthritis of both knees  Discussed with patient physical exam, impression and plan  Physical exam is currently unremarkable for tenderness of both knees, but noted for limited range of motion with flexion to 120° of both knees  First Synvisc injection was administered to both knees without complication  She will follow up in 1 week for 2nd injection  Subjective:   Patient ID: Dallas Darby is a 47 y o  female  Chief Complaint   Patient presents with    Left Knee - Pain, Follow-up    Right Knee - Pain, Follow-up       56-year-old female with osteoarthritis of both knees following up for bilateral knee pain more than 6 months duration  She was last seen nearly 2 months ago at which point viscosupplementation was discussed with her  She was also provided with off loading knee braces  She reports that she still continues have pain of both knees described as localized mainly to the anterior lateral aspects, constant, achy and sometimes sharp, worse with ambulation and twisting, and associated swelling  She has been wearing off loading knee brace intermittently  She has not had any buckling or giving out of the knees  Knee Pain   This is a chronic problem  The current episode started more than 1 month ago  The problem occurs constantly  The problem has been unchanged  Associated symptoms include arthralgias and joint swelling  Pertinent negatives include no numbness or weakness  The symptoms are aggravated by twisting and walking  She has tried rest and NSAIDs (Corticosteroid injection) for the symptoms  The treatment provided mild relief  Review of Systems   Musculoskeletal: Positive for arthralgias and joint swelling     Neurological: Negative for weakness and numbness  Objective:  Vitals:    11/12/18 1139   BP: 160/93   Pulse: 66   Weight: 84 4 kg (186 lb)   Height: 5' 2" (1 575 m)     Right Knee Exam     Tenderness   The patient is experiencing no tenderness  Range of Motion   Extension: normal   Flexion: 120     Other   Swelling: mild  Other tests: no effusion present      Left Knee Exam     Tenderness   The patient is experiencing no tenderness  Range of Motion   Extension: normal   Flexion: 120     Other   Swelling: mild  Effusion: no effusion present          Observations   Left Knee   Negative for effusion  Right Knee   Negative for effusion  Physical Exam   Constitutional: She is oriented to person, place, and time  She appears well-developed  No distress  HENT:   Head: Normocephalic  Eyes: Conjunctivae are normal    Neck: No tracheal deviation present  Cardiovascular: Normal rate  Pulmonary/Chest: Effort normal  No respiratory distress  Abdominal: She exhibits no distension  Musculoskeletal:        Right knee: She exhibits no effusion  Left knee: She exhibits no effusion  Neurological: She is alert and oriented to person, place, and time  Skin: Skin is warm and dry  Psychiatric: She has a normal mood and affect  Her behavior is normal    Nursing note and vitals reviewed        Large joint arthrocentesis  Date/Time: 11/12/2018 12:07 PM  Consent given by: patient  Site marked: site marked  Timeout: Immediately prior to procedure a time out was called to verify the correct patient, procedure, equipment, support staff and site/side marked as required   Supporting Documentation  Indications: pain   Procedure Details  Location: knee - R knee  Preparation: Patient was prepped and draped in the usual sterile fashion  Needle size: 22 G  Approach: anterolateral  Medications administered: 1 mL bupivacaine 0 25 %; 2 mL bupivacaine 0 25 %; 16 mg hylan 16 MG/2ML    Patient tolerance: patient tolerated the procedure well with no immediate complications  Dressing:  Sterile dressing applied  Large joint arthrocentesis  Date/Time: 11/12/2018 12:09 PM  Consent given by: patient  Site marked: site marked  Timeout: Immediately prior to procedure a time out was called to verify the correct patient, procedure, equipment, support staff and site/side marked as required   Supporting Documentation  Indications: pain   Procedure Details  Location: knee - L knee  Preparation: Patient was prepped and draped in the usual sterile fashion  Needle size: 22 G  Approach: anterolateral  Medications administered: 2 mL bupivacaine 0 25 %; 1 mL bupivacaine 0 25 %; 16 mg hylan 16 MG/2ML    Patient tolerance: patient tolerated the procedure well with no immediate complications  Dressing:  Sterile dressing applied

## 2018-11-19 ENCOUNTER — OFFICE VISIT (OUTPATIENT)
Dept: OBGYN CLINIC | Facility: CLINIC | Age: 53
End: 2018-11-19
Payer: COMMERCIAL

## 2018-11-19 VITALS
HEART RATE: 67 BPM | BODY MASS INDEX: 34.23 KG/M2 | WEIGHT: 186 LBS | DIASTOLIC BLOOD PRESSURE: 95 MMHG | HEIGHT: 62 IN | SYSTOLIC BLOOD PRESSURE: 146 MMHG

## 2018-11-19 DIAGNOSIS — M17.12 PRIMARY OSTEOARTHRITIS OF LEFT KNEE: ICD-10-CM

## 2018-11-19 DIAGNOSIS — M17.11 PRIMARY OSTEOARTHRITIS OF RIGHT KNEE: Primary | ICD-10-CM

## 2018-11-19 PROCEDURE — 20610 DRAIN/INJ JOINT/BURSA W/O US: CPT | Performed by: FAMILY MEDICINE

## 2018-11-19 RX ORDER — LIDOCAINE HYDROCHLORIDE 5 MG/ML
3 INJECTION, SOLUTION INFILTRATION; PERINEURAL
Status: COMPLETED | OUTPATIENT
Start: 2018-11-19 | End: 2018-11-19

## 2018-11-19 RX ADMIN — LIDOCAINE HYDROCHLORIDE 3 ML: 5 INJECTION, SOLUTION INFILTRATION; PERINEURAL at 11:45

## 2018-11-19 RX ADMIN — LIDOCAINE HYDROCHLORIDE 3 ML: 5 INJECTION, SOLUTION INFILTRATION; PERINEURAL at 11:44

## 2018-11-19 NOTE — PROGRESS NOTES
Assessment/Plan:  Assessment/Plan   Diagnoses and all orders for this visit:    Primary osteoarthritis of right knee  -     Large joint arthrocentesis    Primary osteoarthritis of left knee  -     Large joint arthrocentesis      80-year-old female with osteoarthritis of both knees  Discussed with patient physical exam, impression, plan  She is currently without any tenderness of the knees  Second Synvisc injection was administered to both knees without complication  She will follow up in 1 week for 3rd Synvisc injection  Subjective:   Patient ID: Sandy Graham is a 47 y o  female  Chief Complaint   Patient presents with    Right Knee - Follow-up, Pain    Left Knee - Follow-up, Pain       80-year-old female with osteoarthritis following up for pain of both knees more than 6 months duration  She was last seen 1 week ago which point she received 1st Synvisc injection to both knees  Today she reports improvement in her pain since her last visit  She has pain that is described as generalized to both knees and worse anterolateral aspects, mild in intensity, achy and sometimes sharp, worse ambulation, and improved with rest       Knee Pain   This is a chronic problem  The current episode started more than 1 month ago  The problem occurs constantly  The problem has been gradually improving  Associated symptoms include arthralgias  Pertinent negatives include no joint swelling, numbness or weakness  The symptoms are aggravated by standing, walking and twisting  She has tried rest (Corticosteroid injection, viscosupplementation) for the symptoms  Review of Systems   Musculoskeletal: Positive for arthralgias  Negative for joint swelling  Neurological: Negative for weakness and numbness  Objective:  Vitals:    11/19/18 1120   BP: 146/95   Pulse: 67   Weight: 84 4 kg (186 lb)   Height: 5' 2" (1 575 m)     Right Knee Exam     Tenderness   The patient is experiencing no tenderness           Range of Motion   Extension: normal   Flexion: 120     Muscle Strength     The patient has normal right knee strength  Other   Other tests: no effusion present      Left Knee Exam     Tenderness   The patient is experiencing no tenderness  Range of Motion   Extension: normal   Flexion: 120     Muscle Strength     The patient has normal left knee strength  Other   Effusion: no effusion present          Observations   Left Knee   Negative for effusion  Right Knee   Negative for effusion  Physical Exam   Constitutional: She is oriented to person, place, and time  She appears well-developed  No distress  HENT:   Head: Normocephalic  Eyes: Conjunctivae are normal    Neck: No tracheal deviation present  Cardiovascular: Normal rate  Pulmonary/Chest: Effort normal  No respiratory distress  Abdominal: She exhibits no distension  Musculoskeletal:        Right knee: She exhibits no effusion  Left knee: She exhibits no effusion  Neurological: She is alert and oriented to person, place, and time  Skin: Skin is warm and dry  Psychiatric: She has a normal mood and affect  Her behavior is normal    Nursing note and vitals reviewed        Large joint arthrocentesis  Date/Time: 11/19/2018 11:44 AM  Consent given by: patient  Site marked: site marked  Timeout: Immediately prior to procedure a time out was called to verify the correct patient, procedure, equipment, support staff and site/side marked as required   Supporting Documentation  Indications: pain   Procedure Details  Location: knee - R knee  Preparation: Patient was prepped and draped in the usual sterile fashion  Needle size: 22 G  Ultrasound guidance: no  Approach: anterolateral  Medications administered: 16 mg hylan 16 MG/2ML; 3 mL lidocaine 0 5 %    Patient tolerance: patient tolerated the procedure well with no immediate complications  Dressing:  Sterile dressing applied  Large joint arthrocentesis  Date/Time: 11/19/2018 11:45 AM  Consent given by: patient  Site marked: site marked  Timeout: Immediately prior to procedure a time out was called to verify the correct patient, procedure, equipment, support staff and site/side marked as required   Supporting Documentation  Indications: pain   Procedure Details  Location: knee - L knee  Preparation: Patient was prepped and draped in the usual sterile fashion  Needle size: 22 G  Ultrasound guidance: no  Approach: anterolateral  Medications administered: 16 mg hylan 16 MG/2ML; 3 mL lidocaine 0 5 %    Patient tolerance: patient tolerated the procedure well with no immediate complications  Dressing:  Sterile dressing applied

## 2018-11-26 ENCOUNTER — OFFICE VISIT (OUTPATIENT)
Dept: OBGYN CLINIC | Facility: CLINIC | Age: 53
End: 2018-11-26
Payer: COMMERCIAL

## 2018-11-26 VITALS
HEART RATE: 66 BPM | HEIGHT: 62 IN | SYSTOLIC BLOOD PRESSURE: 145 MMHG | DIASTOLIC BLOOD PRESSURE: 89 MMHG | WEIGHT: 186 LBS | BODY MASS INDEX: 34.23 KG/M2

## 2018-11-26 DIAGNOSIS — M17.12 PRIMARY OSTEOARTHRITIS OF LEFT KNEE: ICD-10-CM

## 2018-11-26 DIAGNOSIS — M17.11 PRIMARY OSTEOARTHRITIS OF RIGHT KNEE: Primary | ICD-10-CM

## 2018-11-26 PROCEDURE — 20610 DRAIN/INJ JOINT/BURSA W/O US: CPT | Performed by: FAMILY MEDICINE

## 2018-11-26 RX ORDER — LIDOCAINE HYDROCHLORIDE 5 MG/ML
3 INJECTION, SOLUTION INFILTRATION; PERINEURAL
Status: COMPLETED | OUTPATIENT
Start: 2018-11-26 | End: 2018-11-26

## 2018-11-26 RX ORDER — MELOXICAM 15 MG/1
15 TABLET ORAL DAILY
Qty: 30 TABLET | Refills: 2 | Status: SHIPPED | OUTPATIENT
Start: 2018-11-26 | End: 2019-05-30

## 2018-11-26 RX ADMIN — LIDOCAINE HYDROCHLORIDE 3 ML: 5 INJECTION, SOLUTION INFILTRATION; PERINEURAL at 14:54

## 2018-11-26 RX ADMIN — LIDOCAINE HYDROCHLORIDE 3 ML: 5 INJECTION, SOLUTION INFILTRATION; PERINEURAL at 14:53

## 2018-11-26 NOTE — PROGRESS NOTES
Assessment/Plan:  Assessment/Plan   Diagnoses and all orders for this visit:    Primary osteoarthritis of right knee  -     Large joint arthrocentesis  -     meloxicam (MOBIC) 15 mg tablet; Take 1 tablet (15 mg total) by mouth daily    Primary osteoarthritis of left knee  -     Large joint arthrocentesis  -     meloxicam (MOBIC) 15 mg tablet; Take 1 tablet (15 mg total) by mouth daily      81-year-old female with osteoarthritis of both knees  Discussed with patient physical exam, impression, plan  She is currently without any bony or soft tissue tenderness of the knees  Third Synvisc injection was administered to both knees without complication  I discussed with patient that if viscosupplementation is to be repeated, we must wait at least 6 months  In the interim if pain worsens she may receive corticosteroid injection, as her last corticosteroid injection was February 2018  She will follow up on an as-needed basis  Subjective:   Patient ID: Juliano Basilio is a 47 y o  female  Chief Complaint   Patient presents with    Left Knee - Follow-up    Right Knee - Follow-up       81-year-old female with osteoarthritis following up for pain of both knees more than 6 months duration  She was last seen 1 week ago which point she received 2nd Synvisc injection to both knees  Today she reports that pain has been gradually improving since starting the injections  She does report that pain is worse on days of rainy weather and described as generalized to the knees, achy, nonradiating, and worse with ambulation  She does report that since receiving 2nd injection pain has been less intense even on days with rainy weather  She has been taking meloxicam but only on an as-needed basis  Knee Pain   This is a chronic problem  The current episode started more than 1 month ago  The problem occurs constantly  The problem has been gradually improving  Associated symptoms include arthralgias   Pertinent negatives include no joint swelling, numbness or weakness  The symptoms are aggravated by walking  She has tried rest and NSAIDs (Viscosupplementation) for the symptoms  The treatment provided significant relief  Review of Systems   Musculoskeletal: Positive for arthralgias  Negative for joint swelling  Neurological: Negative for weakness and numbness  Objective:  Vitals:    11/26/18 1442   BP: 145/89   Pulse: 66   Weight: 84 4 kg (186 lb)   Height: 5' 2" (1 575 m)     Right Knee Exam     Tenderness   The patient is experiencing no tenderness  Range of Motion   Extension: normal     Muscle Strength     The patient has normal right knee strength  Other   Swelling: none  Other tests: no effusion present    Comments:  Crepitus      Left Knee Exam     Tenderness   The patient is experiencing no tenderness  Range of Motion   Extension: normal     Muscle Strength     The patient has normal left knee strength  Other   Swelling: none  Effusion: no effusion present    Comments:  Crepitus          Observations   Left Knee   Negative for effusion  Right Knee   Negative for effusion  Physical Exam   Constitutional: She is oriented to person, place, and time  She appears well-developed  No distress  HENT:   Head: Normocephalic  Eyes: Conjunctivae are normal    Neck: No tracheal deviation present  Cardiovascular: Normal rate  Pulmonary/Chest: Effort normal  No respiratory distress  Abdominal: She exhibits no distension  Musculoskeletal:        Right knee: She exhibits no effusion  Left knee: She exhibits no effusion  Neurological: She is alert and oriented to person, place, and time  Skin: Skin is warm and dry  Psychiatric: She has a normal mood and affect  Her behavior is normal    Nursing note and vitals reviewed            Large joint arthrocentesis  Date/Time: 11/26/2018 2:53 PM  Consent given by: patient  Site marked: site marked  Timeout: Immediately prior to procedure a time out was called to verify the correct patient, procedure, equipment, support staff and site/side marked as required   Supporting Documentation  Indications: pain   Procedure Details  Location: knee - R knee  Preparation: Patient was prepped and draped in the usual sterile fashion  Needle size: 22 G  Ultrasound guidance: no  Approach: anterolateral  Medications administered: 3 mL lidocaine 0 5 %; 16 mg hylan 16 MG/2ML    Patient tolerance: patient tolerated the procedure well with no immediate complications  Dressing:  Sterile dressing applied  Large joint arthrocentesis  Date/Time: 11/26/2018 2:54 PM  Consent given by: patient  Site marked: site marked  Timeout: Immediately prior to procedure a time out was called to verify the correct patient, procedure, equipment, support staff and site/side marked as required   Supporting Documentation  Indications: pain   Procedure Details  Location: knee - L knee  Preparation: Patient was prepped and draped in the usual sterile fashion  Needle size: 22 G  Ultrasound guidance: no  Approach: anterolateral  Medications administered: 3 mL lidocaine 0 5 %; 16 mg hylan 16 MG/2ML    Patient tolerance: patient tolerated the procedure well with no immediate complications  Dressing:  Sterile dressing applied

## 2019-02-04 ENCOUNTER — OFFICE VISIT (OUTPATIENT)
Dept: INTERNAL MEDICINE CLINIC | Facility: CLINIC | Age: 54
End: 2019-02-04
Payer: COMMERCIAL

## 2019-02-04 ENCOUNTER — TELEPHONE (OUTPATIENT)
Dept: INTERNAL MEDICINE CLINIC | Facility: CLINIC | Age: 54
End: 2019-02-04

## 2019-02-04 VITALS
HEART RATE: 63 BPM | OXYGEN SATURATION: 99 % | WEIGHT: 183.6 LBS | DIASTOLIC BLOOD PRESSURE: 90 MMHG | SYSTOLIC BLOOD PRESSURE: 140 MMHG | HEIGHT: 62 IN | BODY MASS INDEX: 33.79 KG/M2

## 2019-02-04 DIAGNOSIS — F33.41 RECURRENT MAJOR DEPRESSIVE DISORDER, IN PARTIAL REMISSION (HCC): ICD-10-CM

## 2019-02-04 DIAGNOSIS — I10 BENIGN ESSENTIAL HYPERTENSION: Primary | ICD-10-CM

## 2019-02-04 PROCEDURE — 3008F BODY MASS INDEX DOCD: CPT | Performed by: INTERNAL MEDICINE

## 2019-02-04 PROCEDURE — 1036F TOBACCO NON-USER: CPT | Performed by: INTERNAL MEDICINE

## 2019-02-04 PROCEDURE — 99214 OFFICE O/P EST MOD 30 MIN: CPT | Performed by: INTERNAL MEDICINE

## 2019-02-04 RX ORDER — OLMESARTAN MEDOXOMIL AND HYDROCHLOROTHIAZIDE 40/25 40; 25 MG/1; MG/1
1 TABLET ORAL DAILY
Qty: 90 TABLET | Refills: 1 | Status: SHIPPED | OUTPATIENT
Start: 2019-02-04 | End: 2019-02-04 | Stop reason: SDUPTHER

## 2019-02-04 RX ORDER — OLMESARTAN MEDOXOMIL AND HYDROCHLOROTHIAZIDE 40/25 40; 25 MG/1; MG/1
1 TABLET ORAL DAILY
Qty: 90 TABLET | Refills: 1 | Status: SHIPPED | OUTPATIENT
Start: 2019-02-04 | End: 2019-05-30 | Stop reason: SDUPTHER

## 2019-02-04 NOTE — PROGRESS NOTES
INTERNAL MEDICINE FOLLOW-UP OFFICE VISIT  [facility] AT [facility], [place]    NAME: Fiona Hi  AGE: 47 y o  SEX: female  : 1965   MRN: [id number]    DATE: 2019  TIME: 12:19 PM    Assessment and Plan     Diagnoses and all orders for this visit:    Benign essential hypertension  -     olmesartan-hydrochlorothiazide (BENICAR HCT) 40-25 MG per tablet; Take 1 tablet by mouth daily  Patient has been taking double the dose of the losartan and hydrochlorothiazide for the last 1 month but her blood pressure was still high today  She she was told to discontinue the losartan and I started her on olmesartan and hydrochlorothiazide  She will continue to monitor blood pressure at home and will come back in a month for recheck  Recurrent major depressive disorder, in partial remission (HCC)  -     sertraline (ZOLOFT) 50 mg tablet; Take 1 tablet (50 mg total) by mouth daily  She has been taking Zoloft in the past and the symptoms have come back again  I told her to restart the Zoloft at 50 mg daily  I will reassess her in 1 month  - Counseling Documentation: patient was counseled regarding: instructions for management, risk factor reductions, prognosis, patient and family education, impressions, risks and benefits of treatment options and importance of compliance with treatment  - Medication Side Effects: Adverse side effects of medications were reviewed with the patient/guardian today  Return to office in: 1 month    Chief Complaint     Chief Complaint   Patient presents with    Follow-up    Headache    Neck Pain     right       History of Present Illness     Hypertension   This is a chronic problem  The current episode started more than 1 year ago  The problem has been gradually worsening since onset  The problem is uncontrolled  Associated symptoms include anxiety, headaches, malaise/fatigue and neck pain  Pertinent negatives include no chest pain, palpitations or shortness of breath  There are no associated agents to hypertension  Risk factors for coronary artery disease include obesity and post-menopausal state  Past treatments include angiotensin blockers and diuretics  The current treatment provides moderate improvement  There are no compliance problems  Depression   This is a recurrent problem  The current episode started more than 1 month ago  The problem occurs constantly  The problem has been gradually worsening  Associated symptoms include fatigue, headaches, myalgias and neck pain  Pertinent negatives include no abdominal pain, arthralgias, chest pain, chills, congestion, coughing, diaphoresis, fever, joint swelling, nausea, numbness, rash, sore throat, vomiting or weakness  The symptoms are aggravated by stress  She has tried nothing for the symptoms  The following portions of the patient's history were reviewed and updated as appropriate: allergies, current medications, past family history, past medical history, past social history, past surgical history and problem list     Review of Systems     Review of Systems   Constitutional: Positive for fatigue and malaise/fatigue  Negative for chills, diaphoresis and fever  HENT: Negative for congestion, ear discharge, ear pain, hearing loss, postnasal drip, rhinorrhea, sinus pain, sinus pressure, sneezing, sore throat and voice change  Eyes: Negative for pain, discharge, redness and visual disturbance  Respiratory: Negative for cough, chest tightness, shortness of breath and wheezing  Cardiovascular: Negative for chest pain, palpitations and leg swelling  Gastrointestinal: Negative for abdominal distention, abdominal pain, blood in stool, constipation, diarrhea, nausea and vomiting  Endocrine: Negative for cold intolerance, heat intolerance, polydipsia, polyphagia and polyuria  Genitourinary: Negative for dysuria, flank pain, frequency, hematuria and urgency  Musculoskeletal: Positive for myalgias and neck pain  Negative for arthralgias, back pain, gait problem, joint swelling and neck stiffness  Skin: Negative for rash  Neurological: Positive for headaches  Negative for dizziness, tremors, syncope, facial asymmetry, speech difficulty, weakness, light-headedness and numbness  Hematological: Does not bruise/bleed easily  Psychiatric/Behavioral: Positive for depression and dysphoric mood  Negative for behavioral problems, confusion and sleep disturbance  The patient is not nervous/anxious  Active Problem List     Patient Active Problem List   Diagnosis    Arthralgia of both knees    Benign essential hypertension    Edema    Postgastrectomy malabsorption    Osteoarthritis    Vitamin D deficiency    Bariatric surgery status    Gastroesophageal reflux disease without esophagitis    Tarsal coalition of right foot    Generalized anxiety disorder    Positive PPD    Obesity (BMI 30-39  9)    Recurrent major depressive disorder, in partial remission (HCC)       Objective     /90   Pulse 63   Ht 5' 2" (1 575 m)   Wt 83 3 kg (183 lb 9 6 oz)   SpO2 99%   BMI 33 58 kg/m²     Physical Exam   Constitutional: She is oriented to person, place, and time  She appears well-developed and well-nourished  No distress  HENT:   Head: Normocephalic and atraumatic  Right Ear: External ear normal    Left Ear: External ear normal    Nose: Nose normal    Mouth/Throat: Oropharynx is clear and moist    Eyes: Conjunctivae and EOM are normal  Right eye exhibits no discharge  Left eye exhibits no discharge  No scleral icterus  Neck: Normal range of motion  Neck supple  No JVD present  No tracheal deviation present  No thyromegaly present  Cardiovascular: Normal rate, regular rhythm, normal heart sounds and intact distal pulses  Exam reveals no gallop and no friction rub  No murmur heard  Pulmonary/Chest: Effort normal and breath sounds normal  No respiratory distress  She has no wheezes  She has no rales   She exhibits no tenderness  Abdominal: Soft  Bowel sounds are normal  She exhibits no distension  There is no tenderness  There is no rebound and no guarding  Musculoskeletal: Normal range of motion  She exhibits no edema or tenderness  Lymphadenopathy:     She has no cervical adenopathy  Neurological: She is alert and oriented to person, place, and time  No cranial nerve deficit  She exhibits normal muscle tone  Coordination normal    Skin: Skin is warm and dry  No rash noted  She is not diaphoretic  No erythema     Psychiatric: Judgment normal    Very depressed           Current Medications       Current Outpatient Prescriptions:     acetaminophen (TYLENOL) 325 mg tablet, every 6 (six) hours, Disp: , Rfl:     Biotin 5000 MCG CAPS, Take 1 capsule by mouth daily, Disp: , Rfl:     Cholecalciferol (VITAMIN D) 2000 units CAPS, Take by mouth daily, Disp: , Rfl:     meloxicam (MOBIC) 15 mg tablet, Take 1 tablet (15 mg total) by mouth daily, Disp: 30 tablet, Rfl: 2    Multiple Vitamins-Minerals (MULTIVITAMIN ADULT EXTRA C PO), multivitamin, Disp: , Rfl:     Omega-3 Fatty Acids (FISH OIL PO), Take by mouth, Disp: , Rfl:     omeprazole (PriLOSEC) 20 mg delayed release capsule, Take 1 capsule (20 mg total) by mouth daily for 30 days, Disp: 30 capsule, Rfl: 2    traMADol (ULTRAM) 50 mg tablet, Take 1 tablet (50 mg total) by mouth daily as needed for moderate pain, Disp: 30 tablet, Rfl: 0    olmesartan-hydrochlorothiazide (BENICAR HCT) 40-25 MG per tablet, Take 1 tablet by mouth daily, Disp: 90 tablet, Rfl: 1    sertraline (ZOLOFT) 50 mg tablet, Take 1 tablet (50 mg total) by mouth daily, Disp: 90 tablet, Rfl: 1    Health Maintenance     Health Maintenance   Topic Date Due    CRC Screening: Colonoscopy  1965    DTaP,Tdap,and Td Vaccines (1 - Tdap) 09/03/1987    PAP SMEAR  01/13/2018    MAMMOGRAM  04/13/2018    INFLUENZA VACCINE  07/01/2018    PT PLAN OF CARE  08/10/2018    Depression Screening PHQ 07/11/2019     Immunization History   Administered Date(s) Administered    Hep B, adult 08/31/2011    Influenza 10/01/2013    Influenza TIV (IM) 10/01/2013         Selena Das MD  [address] [facility]

## 2019-02-04 NOTE — TELEPHONE ENCOUNTER
Pt cld and stated that the medications that were sent in today, SERTRALINE and OLMESARTAN-[facility]OTH[facility]ZIDE [facility] was not received  Pt called and asked that it be resent but send it to the [facility] in [facility]

## 2019-02-04 NOTE — LETTER
February 4, 2019     Patient: Fiona Hi   YOB: 1965   Date of Visit: 2/4/2019       To Whom it May Concern:    Fiona Hi is under my professional care  She was seen in my office on 2/4/2019  She may return to work on 2/5/19  If you have any questions or concerns, please don't hesitate to call           Sincerely,          Darron Lott MD        CC: Fiona Hi

## 2019-05-30 ENCOUNTER — OFFICE VISIT (OUTPATIENT)
Dept: INTERNAL MEDICINE CLINIC | Facility: CLINIC | Age: 54
End: 2019-05-30
Payer: COMMERCIAL

## 2019-05-30 ENCOUNTER — OFFICE VISIT (OUTPATIENT)
Dept: OBGYN CLINIC | Facility: CLINIC | Age: 54
End: 2019-05-30
Payer: COMMERCIAL

## 2019-05-30 VITALS
SYSTOLIC BLOOD PRESSURE: 123 MMHG | HEART RATE: 61 BPM | DIASTOLIC BLOOD PRESSURE: 80 MMHG | HEIGHT: 62 IN | BODY MASS INDEX: 33.86 KG/M2 | WEIGHT: 184 LBS

## 2019-05-30 VITALS
SYSTOLIC BLOOD PRESSURE: 118 MMHG | BODY MASS INDEX: 33.68 KG/M2 | HEIGHT: 62 IN | HEART RATE: 62 BPM | OXYGEN SATURATION: 98 % | DIASTOLIC BLOOD PRESSURE: 82 MMHG | TEMPERATURE: 97.6 F | WEIGHT: 183 LBS

## 2019-05-30 DIAGNOSIS — G89.29 CHRONIC PAIN OF BOTH KNEES: ICD-10-CM

## 2019-05-30 DIAGNOSIS — M25.561 CHRONIC PAIN OF BOTH KNEES: ICD-10-CM

## 2019-05-30 DIAGNOSIS — I10 BENIGN ESSENTIAL HYPERTENSION: ICD-10-CM

## 2019-05-30 DIAGNOSIS — M25.562 CHRONIC PAIN OF BOTH KNEES: ICD-10-CM

## 2019-05-30 DIAGNOSIS — Z12.11 SCREENING FOR COLON CANCER: ICD-10-CM

## 2019-05-30 DIAGNOSIS — F33.41 RECURRENT MAJOR DEPRESSIVE DISORDER, IN PARTIAL REMISSION (HCC): ICD-10-CM

## 2019-05-30 DIAGNOSIS — Z12.4 SCREENING FOR CERVICAL CANCER: ICD-10-CM

## 2019-05-30 DIAGNOSIS — M17.11 PRIMARY OSTEOARTHRITIS OF RIGHT KNEE: Primary | ICD-10-CM

## 2019-05-30 DIAGNOSIS — Z12.39 SCREENING FOR BREAST CANCER: Primary | ICD-10-CM

## 2019-05-30 DIAGNOSIS — M17.12 PRIMARY OSTEOARTHRITIS OF LEFT KNEE: ICD-10-CM

## 2019-05-30 PROCEDURE — 3008F BODY MASS INDEX DOCD: CPT | Performed by: PHYSICIAN ASSISTANT

## 2019-05-30 PROCEDURE — 3074F SYST BP LT 130 MM HG: CPT | Performed by: PHYSICIAN ASSISTANT

## 2019-05-30 PROCEDURE — 1036F TOBACCO NON-USER: CPT | Performed by: PHYSICIAN ASSISTANT

## 2019-05-30 PROCEDURE — 99213 OFFICE O/P EST LOW 20 MIN: CPT | Performed by: FAMILY MEDICINE

## 2019-05-30 PROCEDURE — 3079F DIAST BP 80-89 MM HG: CPT | Performed by: PHYSICIAN ASSISTANT

## 2019-05-30 PROCEDURE — 99214 OFFICE O/P EST MOD 30 MIN: CPT | Performed by: PHYSICIAN ASSISTANT

## 2019-05-30 PROCEDURE — 20610 DRAIN/INJ JOINT/BURSA W/O US: CPT | Performed by: FAMILY MEDICINE

## 2019-05-30 RX ORDER — MELOXICAM 15 MG/1
15 TABLET ORAL DAILY
Qty: 120 TABLET | Refills: 0 | Status: SHIPPED | OUTPATIENT
Start: 2019-05-30

## 2019-05-30 RX ORDER — OLMESARTAN MEDOXOMIL AND HYDROCHLOROTHIAZIDE 40/25 40; 25 MG/1; MG/1
1 TABLET ORAL DAILY
Qty: 90 TABLET | Refills: 1 | Status: SHIPPED | OUTPATIENT
Start: 2019-05-30

## 2019-05-30 RX ORDER — TRIAMCINOLONE ACETONIDE 40 MG/ML
40 INJECTION, SUSPENSION INTRA-ARTICULAR; INTRAMUSCULAR
Status: COMPLETED | OUTPATIENT
Start: 2019-05-30 | End: 2019-05-30

## 2019-05-30 RX ORDER — LIDOCAINE HYDROCHLORIDE 10 MG/ML
2 INJECTION, SOLUTION INFILTRATION; PERINEURAL
Status: COMPLETED | OUTPATIENT
Start: 2019-05-30 | End: 2019-05-30

## 2019-05-30 RX ORDER — LIDOCAINE HYDROCHLORIDE 10 MG/ML
4 INJECTION, SOLUTION INFILTRATION; PERINEURAL
Status: COMPLETED | OUTPATIENT
Start: 2019-05-30 | End: 2019-05-30

## 2019-05-30 RX ORDER — GABAPENTIN 100 MG/1
100 CAPSULE ORAL DAILY
Qty: 120 CAPSULE | Refills: 0 | Status: SHIPPED | OUTPATIENT
Start: 2019-05-30

## 2019-05-30 RX ADMIN — LIDOCAINE HYDROCHLORIDE 2 ML: 10 INJECTION, SOLUTION INFILTRATION; PERINEURAL at 13:46

## 2019-05-30 RX ADMIN — LIDOCAINE HYDROCHLORIDE 4 ML: 10 INJECTION, SOLUTION INFILTRATION; PERINEURAL at 13:47

## 2019-05-30 RX ADMIN — LIDOCAINE HYDROCHLORIDE 4 ML: 10 INJECTION, SOLUTION INFILTRATION; PERINEURAL at 13:46

## 2019-05-30 RX ADMIN — LIDOCAINE HYDROCHLORIDE 2 ML: 10 INJECTION, SOLUTION INFILTRATION; PERINEURAL at 13:47

## 2019-05-30 RX ADMIN — TRIAMCINOLONE ACETONIDE 40 MG: 40 INJECTION, SUSPENSION INTRA-ARTICULAR; INTRAMUSCULAR at 13:47

## 2019-05-30 RX ADMIN — TRIAMCINOLONE ACETONIDE 40 MG: 40 INJECTION, SUSPENSION INTRA-ARTICULAR; INTRAMUSCULAR at 13:46

## 2019-05-31 ENCOUNTER — TELEPHONE (OUTPATIENT)
Dept: PAIN MEDICINE | Facility: CLINIC | Age: 54
End: 2019-05-31

## 2019-06-19 DIAGNOSIS — Z12.11 SCREENING FOR COLON CANCER: Primary | ICD-10-CM

## 2019-07-19 ENCOUNTER — TELEPHONE (OUTPATIENT)
Dept: BARIATRICS | Facility: CLINIC | Age: 54
End: 2019-07-19

## 2019-07-19 NOTE — TELEPHONE ENCOUNTER
I called left a message for patient about the linx procedure being offered by Dr Ble Vargas if she would like to learn more about it to contact the office for a consultation [facility]'s insurance does cover but will need to be preauthorized

## 2019-09-06 ENCOUNTER — CONSULT (OUTPATIENT)
Dept: PAIN MEDICINE | Facility: CLINIC | Age: 54
End: 2019-09-06
Payer: COMMERCIAL

## 2019-09-06 VITALS
BODY MASS INDEX: 33.86 KG/M2 | RESPIRATION RATE: 18 BRPM | DIASTOLIC BLOOD PRESSURE: 70 MMHG | HEIGHT: 62 IN | HEART RATE: 65 BPM | WEIGHT: 184 LBS | SYSTOLIC BLOOD PRESSURE: 110 MMHG

## 2019-09-06 DIAGNOSIS — M17.11 PRIMARY OSTEOARTHRITIS OF RIGHT KNEE: ICD-10-CM

## 2019-09-06 DIAGNOSIS — M17.12 PRIMARY OSTEOARTHRITIS OF LEFT KNEE: ICD-10-CM

## 2019-09-06 PROCEDURE — 99244 OFF/OP CNSLTJ NEW/EST MOD 40: CPT | Performed by: ANESTHESIOLOGY

## 2019-09-06 NOTE — H&P (VIEW-ONLY)
Assessment  1  Primary osteoarthritis of right knee  Ambulatory referral to Pain Management    [place] spine and pain procedure   2  Primary osteoarthritis of left knee  Ambulatory referral to Pain Management    FL spine and pain procedure       Plan  This is a 67-year-old female who presents today with bilateral knee osteoarthritis due to moderate to severe degenerative joint disease  On examination, there is no gross motor deficits  There is crepitus on flexion and extension of the knee bilaterally  Patient has attempted a trial of Synvisc injection as well as cortisone injections intra-articularly without significant relief of pain  She is here today to consider other options  Gabapentin as well as NSAIDs has provided some relief of pain  The patient's pain persists despite time, relative rest, activity modification and therapy  Patient has moderate to severe osteoarthritis and has tried Synvisc injection as well as cortisone shots without much relief of pain  Today, we will consider a bilateral genicular nerve block trials x2 and radiofrequency ablation to buy her some time before considering surgery  This was reviewed with the patient today  We will move forward with a bilateral genicular nerve block utilizing a double block paradigm  If the patient receives significant relief of appropriate duration with 2% lidocaine, we will confirm with   25% bupivacaine  If the patient demonstrates appropriate response to genicular nerve block, we will schedule radiofrequency ablation of the genicular nerve branches  In the office today, we reviewed the nature of the patient's pathology in depth using diagrams and models  We discussed the approach we would take for the nerve block and provided literature for home review   The patient understands the risks associated with the procedure including bleeding, infection, tissue action, allergic reaction, nerve injury and verbal and written consent was obtained today     My impressions and treatment recommendations were discussed in detail with the patient who verbalized understanding and had no further questions  Discharge instructions were provided  I personally saw and examined the patient and I agree with the above discussed plan of care  History of Present Illness    Greg Arriaza is a 49 y o  female who presents today for initial consultation regarding bilateral knee pain and right ankle pain  Of note, patient reports severe pain which she rates 10/10 on the pain scale  Her pain is constant and interferes with her daily activities  Patient reports pain which is of no typical pattern  Pain is dull/achy in nature  She reports pain which is worsened with prolonged standing, bending, walking and exercise  Patient has had physical therapy in the past without any relief  She reports history of arthritis  She uses heat and ice without much relief of pain  Patient is currently on meloxicam as needed as well as ibuprofen p r n  Parvez Balint She reports occasional joint swelling  She denies weakness  X-ray of the knees demonstrates moderate to severe tricompartmental/degenerative osteoarthritis  Patient has attempted a trial of intra-articular knee joint injections bilaterally as well as Synvisc injection without much relief of pain  Patient only reported 1 month relief following intra-articular steroid injection  Patient is here today to consider other options as she would like to buy some more time prior to considering surgery  I have personally reviewed and/or updated the patient's past medical history, past surgical history, family history, social history, current medications, allergies, and vital signs today  Review of Systems   Constitutional: Negative for fever and unexpected weight change  HENT: Negative for trouble swallowing  Eyes: Negative for visual disturbance  Respiratory: Negative for shortness of breath and wheezing      Cardiovascular: Negative for chest pain and palpitations  Gastrointestinal: Negative for constipation, diarrhea, nausea and vomiting  Endocrine: Negative for cold intolerance, heat intolerance and polydipsia  Genitourinary: Negative for difficulty urinating and frequency  Musculoskeletal: Positive for arthralgias and joint swelling  Negative for gait problem and myalgias  Skin: Negative for rash  Neurological: Negative for dizziness, seizures, syncope, weakness and headaches  Hematological: Does not bruise/bleed easily  Psychiatric/Behavioral: Positive for dysphoric mood  All other systems reviewed and are negative  Patient Active Problem List   Diagnosis    Arthralgia of both knees    Benign essential hypertension    Edema    Postgastrectomy malabsorption    Osteoarthritis    Vitamin D deficiency    Bariatric surgery status    Gastroesophageal reflux disease without esophagitis    Tarsal coalition of right foot    Generalized anxiety disorder    Positive PPD    Obesity (BMI 30-39  9)    [facility]       Past Medical History:   Diagnosis Date    Anxiety     Arthritis     Depression 11/15/2013    GERD (gastroesophageal reflux disease)     Hypertension     Obese     Postgastrectomy malabsorption     Vitamin D deficiency     Last Assessed: 11Aug2015       Past Surgical History:   Procedure Laterality Date    ABDOMINAL SURGERY      ESOPHAGOGASTRODUODENOSCOPY      ESOPHAGOGASTRODUODENOSCOPY N/A 5/24/2018    Procedure: EGD with biopsy;  Surgeon: Erin Fernandez MD;  [facility]      Gastric Surgery for Morbid Obesity Laparoscopic Longitudinal Gastrectomy: Onset: [id number]    GASTRIC RESTRICTION SURGERY      for Morbid Obesity; Last Assessed: [id number]    HERNIA REPAIR      HIATAL HERNIA REPAIR      PARAESOPHAGEAL HERNIA REPAIR      [place] Hernia;  Onset: [id number]   [place] STOMACH SURGERY      TUBAL LIGATION      TUBAL LIGATION         Family History   Problem Relation Age of Onset    Diabetes Mother     Hypertension Mother     No Known Problems Family        Social History     Occupational History    Not on file   Tobacco Use    Smoking status: Never Smoker    Smokeless tobacco: Never Used   Substance and Sexual Activity    Alcohol use: No    Drug use: No    Sexual activity: Not Currently     Birth control/protection: Surgical       Current Outpatient Medications on File Prior to Visit   Medication Sig    Biotin 5000 MCG CAPS Take 1 capsule by mouth daily    Cholecalciferol (VITAMIN D) 2000 units CAPS Take by mouth daily    gabapentin (NEURONTIN) 100 mg capsule Take 1 capsule (100 mg total) by mouth daily    meloxicam (MOBIC) 15 mg tablet Take 1 tablet (15 mg total) by mouth daily    Multiple Vitamins-Minerals (MULTIVITAMIN ADULT EXTRA C PO) multivitamin    olmesartan-hydrochlorothiazide (BENICAR HCT) 40-25 MG per tablet Take 1 tablet by mouth daily    Omega-3 Fatty Acids (FISH OIL PO) Take by mouth    omeprazole (PriLOSEC) 20 mg delayed release capsule Take 1 capsule (20 mg total) by mouth daily for 30 days    [DISCONTINUED] acetaminophen (TYLENOL) 325 mg tablet every 6 (six) hours    [DISCONTINUED] cyanocobalamin (VITAMIN B-12) 1,000 mcg tablet Take 1,000 mcg by mouth daily    [DISCONTINUED] sertraline (ZOLOFT) 50 mg tablet Take 1 tablet (50 mg total) by mouth daily     No current facility-administered medications on file prior to visit  Allergies   Allergen Reactions    Nickel      rash       Physical Exam    /70   Pulse 65   Resp 18   Ht 5' 2" (1 575 m)   Wt 83 5 kg (184 lb)   BMI 33 65 kg/m²     Constitutional: normal, well developed, well nourished, alert, in no distress and non-toxic and no overt pain behavior    Eyes: anicteric  HEENT: grossly intact  Neck: supple, symmetric, trachea midline and no masses   Pulmonary:even and unlabored  Cardiovascular:No edema or pitting edema present  Skin:Normal without rashes or lesions and well hydrated  Psychiatric:Mood and affect appropriate  Neurologic:Cranial Nerves II-XII grossly intact  Musculoskeletal:normal    Imaging  LEFT KNEE     INDICATION: 71-year-old female, chronic left knee pain     COMPARISON:  2/14/2018 x-rays     VIEWS:  XR KNEE 3 VW LEFT NON INJURY   Images: 3     FINDINGS:     There is no acute fracture or dislocation      Persistent probable small joint effusion     Progressive moderate to severe degenerative arthritis involving medial and patellofemoral compartments    Mild to moderate lateral compartment degenerative arthritis     No lytic or blastic lesions are seen      Soft tissues are unremarkable      IMPRESSION:  Tricompartmental degenerative arthritis with interval progression, small effusion     No acute osseous abnormality        RIGHT KNEE     INDICATION:  Pain laterally and posteriorly for a couple days     COMPARISON: None     VIEWS:  3     IMAGES:  3     FINDINGS:     There is no acute fracture or dislocation      Small joint effusion noted      Moderate tricompartmental osteoarthritis most severe medially      No lytic or blastic lesions are seen      Soft tissues are unremarkable      IMPRESSION:     Tricompartmental osteoarthritis and joint effusion noted

## 2019-09-06 NOTE — PROGRESS NOTES
Assessment  1  Primary osteoarthritis of right knee  Ambulatory referral to Pain Management    FL spine and pain procedure   2  Primary osteoarthritis of left knee  Ambulatory referral to Pain Management    FL spine and pain procedure       Plan  This is a 60-year-old female who presents today with bilateral knee osteoarthritis due to moderate to severe degenerative joint disease  On examination, there is no gross motor deficits  There is crepitus on flexion and extension of the knee bilaterally  Patient has attempted a trial of Synvisc injection as well as cortisone injections intra-articularly without significant relief of pain  She is here today to consider other options  Gabapentin as well as NSAIDs has provided some relief of pain  The patient's pain persists despite time, relative rest, activity modification and therapy  Patient has moderate to severe osteoarthritis and has tried Synvisc injection as well as cortisone shots without much relief of pain  Today, we will consider a bilateral genicular nerve block trials x2 and radiofrequency ablation to buy her some time before considering surgery  This was reviewed with the patient today  We will move forward with a bilateral genicular nerve block utilizing a double block paradigm  If the patient receives significant relief of appropriate duration with 2% lidocaine, we will confirm with   25% bupivacaine  If the patient demonstrates appropriate response to genicular nerve block, we will schedule radiofrequency ablation of the genicular nerve branches  In the office today, we reviewed the nature of the patient's pathology in depth using diagrams and models  We discussed the approach we would take for the nerve block and provided literature for home review   The patient understands the risks associated with the procedure including bleeding, infection, tissue action, allergic reaction, nerve injury and verbal and written consent was obtained today     My impressions and treatment recommendations were discussed in detail with the patient who verbalized understanding and had no further questions  Discharge instructions were provided  I personally saw and examined the patient and I agree with the above discussed plan of care  History of Present Illness    Myranda Walton is a 49 y o  female who presents today for initial consultation regarding bilateral knee pain and right ankle pain  Of note, patient reports severe pain which she rates 10/10 on the pain scale  Her pain is constant and interferes with her daily activities  Patient reports pain which is of no typical pattern  Pain is dull/achy in nature  She reports pain which is worsened with prolonged standing, bending, walking and exercise  Patient has had physical therapy in the past without any relief  She reports history of arthritis  She uses heat and ice without much relief of pain  Patient is currently on meloxicam as needed as well as ibuprofen p r n  Larayne Chroman She reports occasional joint swelling  She denies weakness  X-ray of the knees demonstrates moderate to severe tricompartmental/degenerative osteoarthritis  Patient has attempted a trial of intra-articular knee joint injections bilaterally as well as Synvisc injection without much relief of pain  Patient only reported 1 month relief following intra-articular steroid injection  Patient is here today to consider other options as she would like to buy some more time prior to considering surgery  I have personally reviewed and/or updated the patient's past medical history, past surgical history, family history, social history, current medications, allergies, and vital signs today  Review of Systems   Constitutional: Negative for fever and unexpected weight change  HENT: Negative for trouble swallowing  Eyes: Negative for visual disturbance  Respiratory: Negative for shortness of breath and wheezing      Cardiovascular: Negative for chest pain and palpitations  Gastrointestinal: Negative for constipation, diarrhea, nausea and vomiting  Endocrine: Negative for cold intolerance, heat intolerance and polydipsia  Genitourinary: Negative for difficulty urinating and frequency  Musculoskeletal: Positive for arthralgias and joint swelling  Negative for gait problem and myalgias  Skin: Negative for rash  Neurological: Negative for dizziness, seizures, syncope, weakness and headaches  Hematological: Does not bruise/bleed easily  Psychiatric/Behavioral: Positive for dysphoric mood  All other systems reviewed and are negative  Patient Active Problem List   Diagnosis    Arthralgia of both knees    Benign essential hypertension    Edema    Postgastrectomy malabsorption    Osteoarthritis    Vitamin D deficiency    Bariatric surgery status    Gastroesophageal reflux disease without esophagitis    Tarsal coalition of right foot    Generalized anxiety disorder    Positive PPD    Obesity (BMI 30-39  9)    Recurrent major depressive disorder, [facility]       Past Medical History:   Diagnosis Date    Anxiety     Arthritis     Depression 11/15/2013    GERD (gastroesophageal reflux disease)     Hypertension     Obese     Postgastrectomy malabsorption     Vitamin D deficiency     Last Assessed: 11Aug2015       Past Surgical History:   Procedure Laterality Date    ABDOMINAL SURGERY      ESOPHAGOGASTRODUODENOSCOPY      ESOPHAGOGASTRODUODENOSCOPY N/A 5/24/2018    Procedure: EGD with biopsy;  Surgeon: Velia Wells MD;  [facility]; [facility]      Gastric Surgery for Morbid Obesity Laparoscopic Longitudinal Gastrectomy: Onset: [id number]    GASTRIC RESTRICTION SURGERY      for Morbid Obesity; Last Assessed: [id number]    HERNIA REPAIR      HIATAL HERNIA REPAIR      PARAESOPHAGEAL HERNIA REPAIR      Hiatus Hernia;  Onset: [id number]   [facility] STOMACH SURGERY      TUBAL LIGATION      TUBAL LIGATION         Family History   Problem Relation Age of Onset    Diabetes Mother     Hypertension Mother     No Known Problems Family        Social History     Occupational History    Not on file   Tobacco Use    Smoking status: Never Smoker    Smokeless tobacco: Never Used   Substance and Sexual Activity    Alcohol use: No    Drug use: No    Sexual activity: Not Currently     Birth control/protection: Surgical       Current Outpatient Medications on File Prior to Visit   Medication Sig    Biotin 5000 MCG CAPS Take 1 capsule by mouth daily    Cholecalciferol (VITAMIN D) 2000 units CAPS Take by mouth daily    gabapentin (NEURONTIN) 100 mg capsule Take 1 capsule (100 mg total) by mouth daily    meloxicam (MOBIC) 15 mg tablet Take 1 tablet (15 mg total) by mouth daily    Multiple Vitamins-Minerals (MULTIVITAMIN ADULT EXTRA C PO) multivitamin    olmesartan-hydrochlorothiazide (BENICAR HCT) 40-25 MG per tablet Take 1 tablet by mouth daily    Omega-3 Fatty Acids (FISH OIL PO) Take by mouth    omeprazole (PriLOSEC) 20 mg delayed release capsule Take 1 capsule (20 mg total) by mouth daily for 30 days    [DISCONTINUED] acetaminophen (TYLENOL) 325 mg tablet every 6 (six) hours    [DISCONTINUED] cyanocobalamin (VITAMIN B-12) 1,000 mcg tablet Take 1,000 mcg by mouth daily    [DISCONTINUED] sertraline (ZOLOFT) 50 mg tablet Take 1 tablet (50 mg total) by mouth daily     No current facility-administered medications on file prior to visit  Allergies   Allergen Reactions    Nickel      rash       Physical Exam    /70   Pulse 65   Resp 18   Ht 5' 2" (1 575 m)   Wt 83 5 kg (184 lb)   BMI 33 65 kg/m²     Constitutional: normal, well developed, well nourished, alert, in no distress and non-toxic and no overt pain behavior    Eyes: anicteric  HEENT: grossly intact  Neck: supple, symmetric, trachea midline and no masses   Pulmonary:even and unlabored  Cardiovascular:No edema or pitting edema present  Skin:Normal without rashes or lesions and well hydrated  Psychiatric:Mood and affect appropriate  Neurologic:Cranial Nerves II-XII grossly intact  Musculoskeletal:normal    Imaging  LEFT KNEE     INDICATION: 45-year-old female, chronic left knee pain     COMPARISON:  2/14/2018 x-rays     VIEWS:  XR KNEE 3 VW LEFT NON INJURY   Images: 3     FINDINGS:     There is no acute fracture or dislocation      Persistent probable small joint effusion     Progressive moderate to severe degenerative arthritis involving medial and patellofemoral compartments    Mild to moderate lateral compartment degenerative arthritis     No lytic or blastic lesions are seen      Soft tissues are unremarkable      IMPRESSION:  Tricompartmental degenerative arthritis with interval progression, small effusion     No acute osseous abnormality        RIGHT KNEE     INDICATION:  Pain laterally and posteriorly for a couple days     COMPARISON: None     VIEWS:  3     IMAGES:  3     FINDINGS:     There is no acute fracture or dislocation      Small joint effusion noted      Moderate tricompartmental osteoarthritis most severe medially      No lytic or blastic lesions are seen      Soft tissues are unremarkable      IMPRESSION:     Tricompartmental osteoarthritis and joint effusion noted

## 2019-09-19 ENCOUNTER — HOSPITAL ENCOUNTER (OUTPATIENT)
Dept: RADIOLOGY | Facility: CLINIC | Age: 54
Discharge: HOME | End: 2019-09-19
Attending: ANESTHESIOLOGY | Admitting: ANESTHESIOLOGY
Payer: COMMERCIAL

## 2019-09-19 VITALS
OXYGEN SATURATION: 99 % | TEMPERATURE: 97.1 F | DIASTOLIC BLOOD PRESSURE: 89 MMHG | RESPIRATION RATE: 20 BRPM | HEART RATE: 55 BPM | SYSTOLIC BLOOD PRESSURE: 134 MMHG

## 2019-09-19 DIAGNOSIS — M17.11 PRIMARY OSTEOARTHRITIS OF RIGHT KNEE: ICD-10-CM

## 2019-09-19 DIAGNOSIS — M17.12 PRIMARY OSTEOARTHRITIS OF LEFT KNEE: ICD-10-CM

## 2019-09-19 PROCEDURE — 64450 NJX AA&/STRD OTHER PN/BRANCH: CPT | Performed by: ANESTHESIOLOGY

## 2019-09-19 RX ADMIN — Medication 5 ML: at 08:47

## 2019-09-19 NOTE — DISCHARGE INSTR - LAB

## 2019-09-19 NOTE — INTERVAL H&P NOTE
Update: (This section must be completed if the H&P was completed greater than 24 hrs to procedure or admission)    H&P reviewed  After examining the patient, I find no changed to the H&P since it had been written  Patient re-evaluated   Accept as history and physical     Lance Toro MD/September 19, 2019/8:29 AM

## 2019-09-20 ENCOUNTER — TELEPHONE (OUTPATIENT)
Dept: RADIOLOGY | Facility: CLINIC | Age: 54
End: 2019-09-20

## 2019-09-20 NOTE — TELEPHONE ENCOUNTER
S/p BL genicular nerve block #1 9/18  Pt reports % relief of right knee pain  Pt reports 50-80% relief of left knee pain for 15 minutes, then less than 50%  Schedule #2?

## 2019-09-30 ENCOUNTER — TELEPHONE (OUTPATIENT)
Dept: INTERNAL MEDICINE CLINIC | Facility: CLINIC | Age: 54
End: 2019-09-30

## 2019-10-03 ENCOUNTER — HOSPITAL ENCOUNTER (OUTPATIENT)
Dept: RADIOLOGY | Facility: CLINIC | Age: 54
Discharge: HOME | End: 2019-10-03
Attending: ANESTHESIOLOGY | Admitting: ANESTHESIOLOGY
Payer: COMMERCIAL

## 2019-10-03 VITALS
DIASTOLIC BLOOD PRESSURE: 81 MMHG | HEART RATE: 55 BPM | TEMPERATURE: 97.2 F | RESPIRATION RATE: 20 BRPM | OXYGEN SATURATION: 96 % | SYSTOLIC BLOOD PRESSURE: 134 MMHG

## 2019-10-03 DIAGNOSIS — M17.11 ARTHRITIS OF RIGHT KNEE: ICD-10-CM

## 2019-10-03 DIAGNOSIS — M17.12 ARTHRITIS OF LEFT KNEE: ICD-10-CM

## 2019-10-03 PROCEDURE — 64450 NJX AA&/STRD OTHER PN/BRANCH: CPT | Performed by: ANESTHESIOLOGY

## 2019-10-03 RX ORDER — BUPIVACAINE HCL/PF 2.5 MG/ML
5 VIAL (ML) INJECTION ONCE
Status: COMPLETED | OUTPATIENT
Start: 2019-10-03 | End: 2019-10-03

## 2019-10-03 RX ADMIN — Medication 5 ML: at 09:00

## 2019-10-03 NOTE — DISCHARGE INSTR - LAB

## 2019-10-04 ENCOUNTER — TELEPHONE (OUTPATIENT)
Dept: RADIOLOGY | Facility: CLINIC | Age: 54
End: 2019-10-04

## 2019-10-04 NOTE — TELEPHONE ENCOUNTER
S/p BL genicular nerve block #2 10/3  Pt reports 50-80% relief immediately after procedure and lasting til the following morning  Schedule RFAs?

## 2019-10-08 NOTE — TELEPHONE ENCOUNTER
S/w pt and scheduled Left Genicular RFA 10/24/19 1015 am and Right RFA on 11/7/19 10/45 am arrival  Gave pre procedure instructions

## 2019-10-11 ENCOUNTER — OFFICE VISIT (OUTPATIENT)
Dept: BARIATRICS | Facility: CLINIC | Age: 54
End: 2019-10-11
Payer: COMMERCIAL

## 2019-10-11 VITALS
BODY MASS INDEX: 33.86 KG/M2 | TEMPERATURE: 97.3 F | SYSTOLIC BLOOD PRESSURE: 114 MMHG | WEIGHT: 184 LBS | HEIGHT: 62 IN | DIASTOLIC BLOOD PRESSURE: 76 MMHG

## 2019-10-11 DIAGNOSIS — K21.9 GASTROESOPHAGEAL REFLUX DISEASE WITHOUT ESOPHAGITIS: Primary | ICD-10-CM

## 2019-10-11 DIAGNOSIS — Z98.84 BARIATRIC SURGERY STATUS: ICD-10-CM

## 2019-10-11 PROCEDURE — 99242 OFF/OP CONSLTJ NEW/EST SF 20: CPT | Performed by: SURGERY

## 2019-10-11 NOTE — PROGRESS NOTES
Assessment/Plan:     Diagnoses and all orders for this visit:    Gastroesophageal reflux disease without esophagitis  - Continue omeprazole 20mg once daily with Tums OTC for additional comfort  - 24hr manometry study to be performed and read by Dr Migue Pinto to ensure adequate contractions of esophagus   - EGD to be performed by Dr Fitch Serve to evaluate if hiatal hernia is present   - Will consult Linx consultant to verify patient with nickel allergy can proceed  - If manometry does not show appropriate esophageal motility a Linx would no longer be an appropriate option  - If Linx is contraindicated, may have to start revision pathway for conversion to RNY Gastric Bypass    Bariatric surgery status  -s/p Vertical Sleeve Gastrectomy with Dr Feliz Drummond on 1/20/2015  Presents to the office today for routine follow up  Overall doing Fair  EWL is 40%    · Ulcer Risk assessment:  · Discussion about strict AVOIDANCE of alcohol, NSAIDS, prednisone, and nicotine of all forms  Subjective:      Patient ID: Bryanna Rodriguez is a 49 y o  female  HPI: Patient presenting to clinic status post sleeve gastrectomy with Dr Feliz Drummond for Linx consult regarding severe daily reflux with mild dysphagia to solid foods  The following portions of the patient's history were reviewed and updated as appropriate: allergies, current medications, past family history, past medical history, past social history, past surgical history and problem list     Review of Systems   Constitutional: Negative for unexpected weight change  HENT: Positive for trouble swallowing          + heartburn, - regurgitation   Respiratory: Positive for choking  Cardiovascular: Negative for chest pain  Gastrointestinal: Negative for abdominal distention, abdominal pain, nausea and vomiting           Objective:    /76   Temp (!) 97 3 °F (36 3 °C) (Tympanic)   Ht 5' 2" (1 575 m)   Wt 83 5 kg (184 lb)   BMI 33 65 kg/m²      Physical Exam   Constitutional: She is oriented to person, place, and time  She appears well-developed and well-nourished  No distress  HENT:   Head: Normocephalic and atraumatic  Eyes: Conjunctivae and EOM are normal  No scleral icterus  Neck: Normal range of motion  Neck supple  Cardiovascular: Normal rate  Pulmonary/Chest: Effort normal  No respiratory distress  Abdominal: Soft  She exhibits no distension  There is no tenderness  Musculoskeletal: Normal range of motion  Neurological: She is alert and oriented to person, place, and time  Skin: Skin is warm and dry  Psychiatric: She has a normal mood and affect  Her behavior is normal    Vitals reviewed

## 2019-10-21 ENCOUNTER — TELEPHONE (OUTPATIENT)
Dept: RADIOLOGY | Facility: CLINIC | Age: 54
End: 2019-10-21

## 2019-10-21 NOTE — TELEPHONE ENCOUNTER
Ankit Graham denied Genicular Nerve RFAs as experimental  Please call for peer to peer [phone] ref # [id number]

## 2019-10-23 NOTE — TELEPHONE ENCOUNTER
I am still awaiting Peer to peer to be scheduled  I was told the medical director will give me a call   I have not heard back

## 2019-10-23 NOTE — TELEPHONE ENCOUNTER
Peer to peer done  Insurance company requesting for research article regarding efficacy of genicular nerve RFA  Printed  Please fax over to [phone] with pain diary and office visit notes    Thank you

## 2019-10-24 NOTE — TELEPHONE ENCOUNTER
S/w Bing @ [facility], they rec the additional clinical info faxed in yesterday  As of this morning still has not been reviewed or processed  S/w pt and cxl today's procedure and will f/u with ins tomorrow and provide pt with update then

## 2019-10-24 NOTE — TELEPHONE ENCOUNTER
I spoke with the medical director and they still denied the RFA  Please make patient aware  They are re-reviewing the documents that I sent over  They will send her over to a 3rd party Jeanette Gutierrez and she will be re-evaluated

## 2019-11-06 ENCOUNTER — TELEPHONE (OUTPATIENT)
Dept: BARIATRICS | Facility: CLINIC | Age: 54
End: 2019-11-06

## 2019-11-06 ENCOUNTER — HOSPITAL ENCOUNTER (OUTPATIENT)
Dept: GASTROENTEROLOGY | Facility: HOSPITAL | Age: 54
Discharge: HOME | End: 2019-11-06
Payer: COMMERCIAL

## 2019-11-06 VITALS
RESPIRATION RATE: 16 BRPM | TEMPERATURE: 98.2 F | OXYGEN SATURATION: 99 % | HEART RATE: 57 BPM | DIASTOLIC BLOOD PRESSURE: 79 MMHG | SYSTOLIC BLOOD PRESSURE: 140 MMHG

## 2019-11-06 DIAGNOSIS — K21.9 GASTROESOPHAGEAL REFLUX DISEASE WITHOUT ESOPHAGITIS: ICD-10-CM

## 2019-11-06 PROCEDURE — 91020 GASTRIC MOTILITY STUDIES: CPT

## 2019-11-06 PROCEDURE — 91038 ESOPH IMPED FUNCT TEST > 1HR: CPT | Performed by: INTERNAL MEDICINE

## 2019-11-06 PROCEDURE — 91010 ESOPHAGUS MOTILITY STUDY: CPT | Performed by: INTERNAL MEDICINE

## 2019-11-06 PROCEDURE — 91038 ESOPH IMPED FUNCT TEST > 1HR: CPT

## 2019-11-06 NOTE — TELEPHONE ENCOUNTER
Raj @ [facility] for 3rd party review status  I called pt on both phone #s, unable to leave message   Will try pt again tomorrow morning

## 2019-11-06 NOTE — PERIOPERATIVE NURSING NOTE
Patient brought in the room and educated on procedure  Lidocaine 2% topical solution inserted via nostrils  Manometry catheter inserted via left nostril and positioned and secured  10 liquid swallow, 10 viscous swallow and 1 rapid swallow performed  Patient tolerated procedure  Catheter removed intact  Patient refused to have the [place] study today  Discharge instructions given to patient and patient left the room in stable condition  I called Dr Joanna Boast office and spoke with Terence Gravely and communicated the manometry study was done and not the [place] study because patient refused to have it done

## 2019-11-07 ENCOUNTER — TELEPHONE (OUTPATIENT)
Dept: BARIATRICS | Facility: CLINIC | Age: 54
End: 2019-11-07

## 2019-11-12 ENCOUNTER — TELEPHONE (OUTPATIENT)
Dept: GASTROENTEROLOGY | Facility: HOSPITAL | Age: 54
End: 2019-11-12

## 2019-11-12 RX ORDER — SODIUM CHLORIDE 9 MG/ML
125 INJECTION, SOLUTION INTRAVENOUS CONTINUOUS
Status: CANCELLED | OUTPATIENT
Start: 2019-11-12

## 2019-11-13 ENCOUNTER — HOSPITAL ENCOUNTER (OUTPATIENT)
Dept: GASTROENTEROLOGY | Facility: HOSPITAL | Age: 54
Setting detail: OUTPATIENT SURGERY
Discharge: HOME | End: 2019-11-13
Attending: SURGERY
Payer: COMMERCIAL

## 2019-11-13 ENCOUNTER — ANESTHESIA EVENT (OUTPATIENT)
Dept: GASTROENTEROLOGY | Facility: HOSPITAL | Age: 54
End: 2019-11-13

## 2019-11-13 ENCOUNTER — ANESTHESIA (OUTPATIENT)
Dept: GASTROENTEROLOGY | Facility: HOSPITAL | Age: 54
End: 2019-11-13

## 2019-11-13 VITALS
SYSTOLIC BLOOD PRESSURE: 143 MMHG | HEART RATE: 75 BPM | HEIGHT: 64 IN | TEMPERATURE: 98.9 F | DIASTOLIC BLOOD PRESSURE: 86 MMHG | WEIGHT: 184 LBS | RESPIRATION RATE: 20 BRPM | OXYGEN SATURATION: 100 % | BODY MASS INDEX: 31.41 KG/M2

## 2019-11-13 DIAGNOSIS — E66.9 OBESITY (BMI 30-39.9): Primary | ICD-10-CM

## 2019-11-13 DIAGNOSIS — E66.01 MORBID OBESITY (HCC): ICD-10-CM

## 2019-11-13 PROCEDURE — 88305 TISSUE EXAM BY PATHOLOGIST: CPT | Performed by: PATHOLOGY

## 2019-11-13 PROCEDURE — 43239 EGD BIOPSY SINGLE/MULTIPLE: CPT | Performed by: SURGERY

## 2019-11-13 PROCEDURE — 88342 IMHCHEM/IMCYTCHM 1ST ANTB: CPT | Performed by: PATHOLOGY

## 2019-11-13 RX ORDER — SODIUM CHLORIDE 9 MG/ML
125 INJECTION, SOLUTION INTRAVENOUS CONTINUOUS
Status: DISCONTINUED | OUTPATIENT
Start: 2019-11-13 | End: 2019-11-17 | Stop reason: HOSPADM

## 2019-11-13 RX ORDER — PROPOFOL 10 MG/ML
INJECTION, EMULSION INTRAVENOUS AS NEEDED
Status: DISCONTINUED | OUTPATIENT
Start: 2019-11-13 | End: 2019-11-13 | Stop reason: SURG

## 2019-11-13 RX ORDER — LIDOCAINE HYDROCHLORIDE 20 MG/ML
INJECTION, SOLUTION EPIDURAL; INFILTRATION; INTRACAUDAL; PERINEURAL AS NEEDED
Status: DISCONTINUED | OUTPATIENT
Start: 2019-11-13 | End: 2019-11-13 | Stop reason: SURG

## 2019-11-13 RX ADMIN — LIDOCAINE HYDROCHLORIDE 60 MG: 20 INJECTION, SOLUTION EPIDURAL; INFILTRATION; INTRACAUDAL; PERINEURAL at 07:40

## 2019-11-13 RX ADMIN — PROPOFOL 50 MG: 10 INJECTION, EMULSION INTRAVENOUS at 07:51

## 2019-11-13 RX ADMIN — PROPOFOL 150 MG: 10 INJECTION, EMULSION INTRAVENOUS at 07:40

## 2019-11-13 RX ADMIN — PROPOFOL 50 MG: 10 INJECTION, EMULSION INTRAVENOUS at 07:43

## 2019-11-13 RX ADMIN — SODIUM CHLORIDE 125 ML/HR: 0.9 INJECTION, SOLUTION INTRAVENOUS at 06:27

## 2019-11-13 NOTE — ANESTHESIA PREPROCEDURE EVALUATION
Review of Systems/Medical History  Patient summary reviewed  Chart reviewed      Cardiovascular  Exercise tolerance (METS): >4,  Hypertension controlled,    Pulmonary  Negative pulmonary ROS        GI/Hepatic    GERD poorly controlled, Bariatric surgery,        Negative  ROS        Endo/Other    Obesity    GYN  Negative gynecology ROS          Hematology   Musculoskeletal    Arthritis     Neurology  Negative neurology ROS      Psychology   Anxiety, Depression ,              Physical Exam    Airway    Mallampati score: II  TM Distance: >3 FB  Neck ROM: full     Dental       Cardiovascular  Rhythm: regular, Rate: normal,     Pulmonary  Breath sounds clear to auscultation,     Other Findings        Anesthesia Plan  ASA Score- 3     Anesthesia Type- IV sedation with anesthesia with ASA Monitors  Additional Monitors:   Airway Plan:         Plan Factors- Patient instructed to abstain from smoking on day of procedure  Patient did not smoke on day of surgery  Induction- intravenous  Postoperative Plan-     Informed Consent- Anesthetic plan and risks discussed with patient  I personally reviewed this patient with the CRNA  Discussed and agreed on the Anesthesia Plan with the CRNA  Shawnee Yañez

## 2019-11-13 NOTE — H&P
H&P EXAM - Outpatient Endoscopy  [facility] INTRA   Jb Cameron 49 y o  female MRN: [id number]  Unit/Bed#:  Encounter: [id number]        Impression: Morbid obesity with refractory GERD status post sleeve gastrectomy    Plan:Upper endoscopy   Chief Complaint:  Refractory GERD    Physical Exam: Normal not in acute distress   Chest: Clear to auscultation   Heart: Normal S1 and S2

## 2019-11-13 NOTE — DISCHARGE INSTRUCTIONS
Hiatal Hernia   WHAT YOU NEED TO KNOW:   A hiatal hernia is a condition that causes part of your stomach to bulge through the hiatus (small opening) in your diaphragm  The part of the stomach may move up and down, or it may get trapped above the diaphragm  DISCHARGE INSTRUCTIONS:   Seek care immediately if:   · You have severe abdominal pain  · You try to vomit but nothing comes out (retching)  · You have severe chest pain and sudden trouble breathing  · Your bowel movements are black or bloody  · Your vomit looks like coffee grounds or has blood in it  Contact your healthcare provider if:   · Your symptoms are getting worse  · You have nausea, and you are vomiting  · You are losing weight without trying  · You have questions or concerns about your condition or care  Medicines:   · Medicines  may be given to relieve heartburn symptoms  These medicines help to decrease or block stomach acid  You may also be given medicines that help to tighten the esophageal sphincter  · Take your medicine as directed  Contact your healthcare provider if you think your medicine is not helping or if you have side effects  Tell him or her if you are allergic to any medicine  Keep a list of the medicines, vitamins, and herbs you take  Include the amounts, and when and why you take them  Bring the list or the pill bottles to follow-up visits  Carry your medicine list with you in case of an emergency  Follow up with your healthcare provider as directed:  Write down your questions so you remember to ask them during your visits  Self care:   · Avoid foods that make your symptoms worse  These may include spicy foods, fruit juices, alcohol, caffeine, chocolate, and mint  · Eat several small meals during the day  Small meals give your stomach less food to digest     · Avoid lying down and bending forward after you eat  Do not eat meals 2 to 3 hours before bedtime   This decreases your risk for reflux  · Maintain a healthy weight  If you are overweight, weight loss may help relieve your symptoms  · Sleep with your head elevated  at least 6 inches  · Do not smoke  Smoking can increase your symptoms of heartburn  © 2017 [address] Information is for End User's use only and may not be sold, redistributed or otherwise used for commercial purposes  All illustrations and images included in CareNotes® are the copyrighted property of [employer]  or [employer]obi [employer]  The above information is an  only  It is not intended as medical advice for individual conditions or treatments  Talk to your doctor, nurse or pharmacist before following any medical regimen to see if it is safe and effective for you  Esophagitis   WHAT YOU NEED TO KNOW:   Esophagitis is inflammation or irritation of the lining of the esophagus  DISCHARGE INSTRUCTIONS:   Call 911 for any of the following:   · You have chest pain that does not go away within a few minutes or gets worse  Seek care immediately if:   · You feel like you have food stuck in your throat and you cannot cough it out  Contact your healthcare provider if:   · You have new or worsening symptoms, even after treatment  · You have questions or concerns about your condition or care  Medicines:   · Medicines  may be given to fight an infection or to control stomach acid  · Take your medicine as directed  Contact your healthcare provider if you think your medicine is not helping or if you have side effects  Tell him or her if you are allergic to any medicine  Keep a list of the medicines, vitamins, and herbs you take  Include the amounts, and when and why you take them  Bring the list or the pill bottles to follow-up visits  Carry your medicine list with you in case of an emergency  Follow up with your healthcare provider as directed: You may need ongoing tests or treatment   Write down your questions so you remember to ask them during your visits  Do not smoke:  Nicotine and other chemicals in cigarettes and cigars can cause blood vessel and lung damage  Ask your healthcare provider for information if you currently smoke and need help to quit  E-cigarettes or smokeless tobacco still contain nicotine  Talk to your healthcare provider before you use these products  Do not drink alcohol:  Alcohol can irritate your esophagus  Talk to your healthcare provider if you need help to stop drinking  Keep batteries and similar objects out of the reach of children:  Babies often put items in their mouths to explore them  Button batteries are easy to swallow and can cause serious damage  Keep the battery covers of electronic devices such as remote controls taped closed  Store all batteries and toxic materials where children cannot get to them  Use childproof locks to keep children away from dangerous materials  Manage or prevent esophagitis:   · Limit or do not eat foods that can lead to esophagitis  Foods such as oranges and salsa can irritate your esophagus  Caffeine and chocolate can cause acid reflux  High-fat and fried foods make your stomach digest food more slowly  This increases the amount of stomach acid your esophagus is exposed to  Eat small meals, and drink water with your meals  Soft foods such as yogurt and applesauce may help soothe your throat  Do not eat for at least 3 hours before you go to bed  · Prevent acid reflux  Do not bend over unless it is necessary  Acid may back up into your esophagus when you bend over  If possible, keep the head of your bed elevated while you sleep  This will help keep acid from backing up  Manage stress  Stress can make your symptoms worse or cause stomach acid to back up  · Drink more liquid when you take pills  Drink a full glass of water when you take your pills   Ask your healthcare provider if you can take your pills at least an hour before you go to bed   © 2017 [facility] Information is for End User's use only and may not be sold, redistributed or otherwise used for commercial purposes  All illustrations and images included in CareNotes® are the copyrighted property of A D [employer] M [employer] Inc  or Tobi Fry  The above information is an  only  It is not intended as medical advice for individual conditions or treatments  Talk to your doctor, nurse or pharmacist before following any medical regimen to see if it is safe and effective for you  Upper Endoscopy   WHAT YOU NEED TO KNOW:   An upper endoscopy is also called an upper gastrointestinal (GI) endoscopy, or an esophagogastroduodenoscopy (EGD)  You may feel bloated, gassy, or have some abdominal discomfort after your procedure  Your throat may be sore for 24 to 36 hours  You may burp or pass gas from air that is still inside your body  DISCHARGE INSTRUCTIONS:   Call 911 for any of the following:   · You have sudden chest pain or trouble breathing  Seek care immediately if:   · You feel dizzy or faint  · You have trouble swallowing  · Your bowel movements are very dark or black  · Your abdomen is hard and firm and you have severe pain  · You vomit blood  Contact your healthcare provider if:   · You feel full or bloated and cannot burp or pass gas  · You have not had a bowel movement for 3 days after your procedure  · You have neck pain  · You have a fever or chills  · You have nausea or are vomiting  · You have a rash or hives  · You have questions or concerns about your endoscopy  Relieve a sore throat:  Suck on throat lozenges or crushed ice  Gargle with a small amount of warm salt water  Mix 1 teaspoon of salt and 1 cup of warm water to make salt water  Relieve gas and discomfort from bloating:  Lie on your right side with a heating pad on your abdomen  Take short walks to help pass gas   Eat small meals until bloating is relieved  Rest after your procedure: You have been given medicine to relax you  Do not  drive or make important decisions until the day after your procedure  Return to your normal activity as directed  You can usually return to work the day after your procedure  Follow up with your healthcare provider as directed:  Write down your questions so you remember to ask them during your visits  © 2017 [address] is for End User's use only and may not be sold, redistributed or otherwise used for commercial purposes  All illustrations and images included in CareNotes® are the copyrighted property of [employer]  or Tobi Fry  The above information is an  only  It is not intended as medical advice for individual conditions or treatments  Talk to your doctor, nurse or pharmacist before following any medical regimen to see if it is safe and effective for you

## 2019-12-22 ENCOUNTER — OFFICE VISIT (OUTPATIENT)
Dept: URGENT CARE | Facility: MEDICAL CENTER | Age: 54
End: 2019-12-22
Payer: COMMERCIAL

## 2019-12-22 VITALS
RESPIRATION RATE: 18 BRPM | HEART RATE: 94 BPM | OXYGEN SATURATION: 96 % | BODY MASS INDEX: 31.38 KG/M2 | WEIGHT: 183.8 LBS | SYSTOLIC BLOOD PRESSURE: 180 MMHG | TEMPERATURE: 99.5 F | HEIGHT: 64 IN | DIASTOLIC BLOOD PRESSURE: 104 MMHG

## 2019-12-22 DIAGNOSIS — J06.9 UPPER RESPIRATORY TRACT INFECTION, UNSPECIFIED TYPE: Primary | ICD-10-CM

## 2019-12-22 LAB — S PYO AG THROAT QL: NEGATIVE

## 2019-12-22 PROCEDURE — S9088 SERVICES PROVIDED IN URGENT: HCPCS | Performed by: PHYSICIAN ASSISTANT

## 2019-12-22 PROCEDURE — 87880 STREP A ASSAY W/OPTIC: CPT | Performed by: PHYSICIAN ASSISTANT

## 2019-12-22 PROCEDURE — 99213 OFFICE O/P EST LOW 20 MIN: CPT | Performed by: PHYSICIAN ASSISTANT

## 2019-12-22 RX ORDER — BENZONATATE 100 MG/1
100 CAPSULE ORAL 3 TIMES DAILY PRN
Qty: 20 CAPSULE | Refills: 0 | Status: SHIPPED | OUTPATIENT
Start: 2019-12-22

## 2019-12-22 NOTE — PROGRESS NOTES
[facility]        NAME: Jose Mae is a 49 y o  female  : 1965    MRN: [id number]  DATE: 2019  TIME: 10:41 AM    Assessment and Plan   Upper respiratory tract infection, unspecified type [J06 9]  1  Upper respiratory tract infection, unspecified type  POCT rapid strepA    benzonatate (TESSALON PERLES) 100 mg capsule     POCT strep negative, likely related to URI symptoms  Recommended symptomatic treatment  Patient Instructions     Use Tylenol or Motrin for pain   may continue Mucinex   use Tessalon Perles for cough   follow-up with PCP if symptoms persist past a week    Chief Complaint     Chief Complaint   Patient presents with    Cold Like Symptoms     Pt  with cough, sore throat, body aches for 2 days  No fever at home  History of Present Illness        Patient is a 26-year-old female who presents today with complaints of cough, congestion, sore throat for the past 2 days  No fevers, some body aches  No chills  Review of Systems   Review of Systems   Constitutional: Negative for chills and fever  HENT: Positive for congestion and sore throat  Negative for ear pain  Respiratory: Positive for cough  Negative for shortness of breath  Cardiovascular: Negative for chest pain  Musculoskeletal: Positive for myalgias           Current Medications       Current Outpatient Medications:     Biotin 5000 MCG CAPS, Take 1 capsule by mouth daily, Disp: , Rfl:     Cholecalciferol (VITAMIN D) 2000 units CAPS, Take by mouth daily, Disp: , Rfl:     gabapentin (NEURONTIN) 100 mg capsule, Take 1 capsule (100 mg total) by mouth daily, Disp: 120 capsule, Rfl: 0    meloxicam (MOBIC) 15 mg tablet, Take 1 tablet (15 mg total) by mouth daily, Disp: 120 tablet, Rfl: 0    Multiple Vitamins-Minerals (MULTIVITAMIN ADULT EXTRA C PO), multivitamin, Disp: , Rfl:     olmesartan-hydrochlorothiazide (BENICAR HCT) 40-25 MG per tablet, Take 1 tablet by mouth daily, Disp: 90 tablet, Rfl: 1    Omega-3 Fatty Acids (FISH OIL PO), Take by mouth, Disp: , Rfl:     benzonatate (TESSALON PERLES) 100 mg capsule, Take 1 capsule (100 mg total) by mouth 3 (three) times a day as needed for cough, Disp: 20 capsule, Rfl: 0    omeprazole (PriLOSEC) 20 mg delayed release capsule, Take 1 capsule (20 mg total) by mouth daily for 30 days, Disp: 30 capsule, Rfl: 2    Current Allergies     Allergies as of 12/22/2019 - Reviewed 12/22/2019   Allergen Reaction Noted    Tramadol Nausea Only 11/13/2019    Nickel  05/24/2018            The following portions of the patient's history were reviewed and updated as appropriate: allergies, current medications, past family history, past medical history, past social history, past surgical history and problem list      Past Medical History:   Diagnosis Date    Anxiety     Arthritis     Depression 11/15/2013    GERD (gastroesophageal reflux disease)     Hypertension     Obese     Postgastrectomy malabsorption     Vitamin D deficiency     Last Assessed: 11Aug2015       Past Surgical History:   Procedure Laterality Date    ABDOMINAL SURGERY      ESOPHAGOGASTRODUODENOSCOPY      ESOPHAGOGASTRODUODENOSCOPY N/A 5/24/2018    Procedure: EGD with biopsy;  Surgeon: Africa Saeed MD;  Location: [facility]; Service: [place]      Gastric Surgery for Morbid Obesity Laparoscopic Longitudinal Gastrectomy: Onset: [id number]    GASTRIC RESTRICTION SURGERY      for Morbid Obesity; Last Assessed: [id number]    HERNIA REPAIR      HIATAL HERNIA REPAIR      PARAESOPHAGEAL HERNIA REPAIR      Hiatus Hernia; Onset: [id number]    STOMACH SURGERY      TUBAL LIGATION      TUBAL LIGATION         Family History   Problem Relation Age of Onset    Diabetes Mother     Hypertension Mother     No Known Problems Family          Medications have been verified          Objective   BP (!) 180/104   Pulse 94   Temp 99 5 °F (37 5 °C) (Temporal)   Resp 18   Ht 5' 4" (1 626 m) Wt 83 4 kg (183 lb 12 8 oz)   SpO2 96%   BMI 31 55 kg/m²        Physical Exam     Physical Exam   Constitutional: She appears well-developed and well-nourished  HENT:   Head: Normocephalic and atraumatic  Right Ear: Tympanic membrane and ear canal normal    Left Ear: Tympanic membrane and ear canal normal    Nose: Nose normal    Mouth/Throat: Uvula is midline and mucous membranes are normal  Posterior oropharyngeal erythema present  No oropharyngeal exudate, posterior oropharyngeal edema or tonsillar abscesses  Eyes: Pupils are equal, round, and reactive to light  Conjunctivae are normal    Neck: Neck supple  Cardiovascular: Normal rate and regular rhythm  Pulmonary/Chest: Effort normal and breath sounds normal    Lymphadenopathy:     She has no cervical adenopathy  Skin: Skin is warm and dry

## 2020-01-08 ENCOUNTER — TELEPHONE (OUTPATIENT)
Dept: BARIATRICS | Facility: CLINIC | Age: 55
End: 2020-01-08

## 2020-01-14 ENCOUNTER — TELEPHONE (OUTPATIENT)
Dept: INTERNAL MEDICINE CLINIC | Facility: CLINIC | Age: 55
End: 2020-01-14

## 2021-02-09 DIAGNOSIS — Z23 ENCOUNTER FOR IMMUNIZATION: ICD-10-CM

## 2021-06-03 ENCOUNTER — TELEPHONE (OUTPATIENT)
Dept: BARIATRICS | Facility: CLINIC | Age: 56
End: 2021-06-03

## 2021-06-03 NOTE — TELEPHONE ENCOUNTER
----- Message from Asif Duvall RN sent at 6/3/2021  7:20 AM EDT -----  Regardin year f/u appointment  Please contact patient via TELEPHONE and LETTER for 6 year follow up appointment  Thank You

## 2021-11-19 ENCOUNTER — IMMUNIZATIONS (OUTPATIENT)
Dept: FAMILY MEDICINE CLINIC | Facility: HOSPITAL | Age: 56
End: 2021-11-19

## 2021-11-19 DIAGNOSIS — Z23 ENCOUNTER FOR IMMUNIZATION: Primary | ICD-10-CM

## 2021-11-19 PROCEDURE — 91300 COVID-19 PFIZER VACC 0.3 ML: CPT

## 2021-11-19 PROCEDURE — 0001A COVID-19 PFIZER VACC 0.3 ML: CPT

## (undated) DEVICE — SINGLE-USE BIOPSY FORCEPS: Brand: RADIAL JAW 4